# Patient Record
Sex: MALE | Race: WHITE | NOT HISPANIC OR LATINO | Employment: OTHER | URBAN - METROPOLITAN AREA
[De-identification: names, ages, dates, MRNs, and addresses within clinical notes are randomized per-mention and may not be internally consistent; named-entity substitution may affect disease eponyms.]

---

## 2021-07-20 ENCOUNTER — TELEPHONE (OUTPATIENT)
Dept: FAMILY MEDICINE CLINIC | Facility: CLINIC | Age: 61
End: 2021-07-20

## 2021-07-20 NOTE — TELEPHONE ENCOUNTER
Caller: Joseph Garcia    Relationship: Self    Best call back number: [phone]    What is the best time to reach you: ANYTIME     Who are you requesting to speak with (clinical staff, provider,  specific staff member): GARY KEENE        What was the call regarding: PATIENT STATES THAT HE BROUGHT HIS MOTHER TO AN APPOINTMENT TO SEE GARY KEENE. AT THAT APPOINTMENT THE PATIENT WAS VERY IMPRESSED HE STATES THAT HE WOULD LIKE TO BE SEEN BY DOCTOR KEENE  FOR PAIN IN HIS LEFT EAR. THE PATIENT DID NOT WANT TO DO A NEW PATIENT APPOINTMENT AND BECOME ESTABLISHED YET HE STATES THAT HE WOULD LIKE TO SEE HOW THE DOCTOR TREATS HIS EAR FIRST PLEASE CALL PATIENT TO DISCUSS    Do you require a callback: YES

## 2021-07-20 NOTE — TELEPHONE ENCOUNTER
Pt has never been to [facility] before but does not want a new patient appt just wants to be seen by you as an acute visit for his ear. Can you advise if this is okay?

## 2021-07-21 NOTE — TELEPHONE ENCOUNTER
Pt was wanting just the ear issued address, pt stated he is going to go to [facility]

## 2021-10-03 ENCOUNTER — TRANSCRIBE ORDERS (OUTPATIENT)
Dept: HOME HEALTH SERVICES | Facility: HOME HEALTHCARE | Age: 61
End: 2021-10-03

## 2021-10-03 ENCOUNTER — HOME HEALTH ADMISSION (OUTPATIENT)
Dept: HOME HEALTH SERVICES | Facility: HOME HEALTHCARE | Age: 61
End: 2021-10-03

## 2021-10-03 DIAGNOSIS — Z47.89 UNSPECIFIED ORTHOPEDIC AFTERCARE: Primary | ICD-10-CM

## 2021-12-18 ENCOUNTER — APPOINTMENT (OUTPATIENT)
Dept: GENERAL RADIOLOGY | Facility: HOSPITAL | Age: 61
End: 2021-12-18

## 2021-12-18 ENCOUNTER — HOSPITAL ENCOUNTER (OUTPATIENT)
Facility: HOSPITAL | Age: 61
Discharge: HOME | End: 2021-12-19
Attending: EMERGENCY MEDICINE | Admitting: UROLOGY

## 2021-12-18 ENCOUNTER — ANESTHESIA (OUTPATIENT)
Dept: PERIOP | Facility: HOSPITAL | Age: 61
End: 2021-12-18

## 2021-12-18 ENCOUNTER — APPOINTMENT (OUTPATIENT)
Dept: CT IMAGING | Facility: HOSPITAL | Age: 61
End: 2021-12-18

## 2021-12-18 ENCOUNTER — ANESTHESIA EVENT (OUTPATIENT)
Dept: PERIOP | Facility: HOSPITAL | Age: 61
End: 2021-12-18

## 2021-12-18 DIAGNOSIS — N20.1 LEFT URETERAL STONE: Primary | ICD-10-CM

## 2021-12-18 DIAGNOSIS — N20.1 URETERIC STONE: ICD-10-CM

## 2021-12-18 DIAGNOSIS — N28.9 MILD RENAL INSUFFICIENCY: ICD-10-CM

## 2021-12-18 DIAGNOSIS — R52 UNCONTROLLED PAIN: ICD-10-CM

## 2021-12-18 PROBLEM — N17.9 AKI (ACUTE KIDNEY INJURY): Status: ACTIVE | Noted: 2021-12-18

## 2021-12-18 LAB
ALBUMIN SERPL-MCNC: 4.6 G/DL (ref 3.5–5.2)
ALBUMIN/GLOB SERPL: 1.6 G/DL
ALP SERPL-CCNC: 94 U/L (ref 39–117)
ALT SERPL W P-5'-P-CCNC: 12 U/L (ref 1–41)
ANION GAP SERPL CALCULATED.3IONS-SCNC: 16 MMOL/L (ref 5–15)
AST SERPL-CCNC: 22 U/L (ref 1–40)
BACTERIA UR QL AUTO: ABNORMAL /HPF
BASOPHILS # BLD AUTO: 0.05 10*3/MM3 (ref 0–0.2)
BASOPHILS NFR BLD AUTO: 0.4 % (ref 0–1.5)
BILIRUB SERPL-MCNC: 0.4 MG/DL (ref 0–1.2)
BILIRUB UR QL STRIP: NEGATIVE
BUN SERPL-MCNC: 23 MG/DL (ref 8–23)
BUN/CREAT SERPL: 14.4 (ref 7–25)
CALCIUM SPEC-SCNC: 10.4 MG/DL (ref 8.6–10.5)
CHLORIDE SERPL-SCNC: 103 MMOL/L (ref 98–107)
CLARITY UR: CLEAR
CO2 SERPL-SCNC: 22 MMOL/L (ref 22–29)
COLOR UR: YELLOW
CREAT SERPL-MCNC: 1.6 MG/DL (ref 0.76–1.27)
DEPRECATED RDW RBC AUTO: 40.2 FL (ref 37–54)
EOSINOPHIL # BLD AUTO: 0.07 10*3/MM3 (ref 0–0.4)
EOSINOPHIL NFR BLD AUTO: 0.5 % (ref 0.3–6.2)
ERYTHROCYTE [DISTWIDTH] IN BLOOD BY AUTOMATED COUNT: 12.9 % (ref 12.3–15.4)
FLUAV SUBTYP SPEC NAA+PROBE: NOT DETECTED
FLUBV RNA ISLT QL NAA+PROBE: NOT DETECTED
GFR SERPL CREATININE-BSD FRML MDRD: 44 ML/MIN/1.73
GLOBULIN UR ELPH-MCNC: 2.9 GM/DL
GLUCOSE SERPL-MCNC: 158 MG/DL (ref 65–99)
GLUCOSE UR STRIP-MCNC: NEGATIVE MG/DL
HBA1C MFR BLD: 6.2 % (ref 4.8–5.6)
HCT VFR BLD AUTO: 41.2 % (ref 37.5–51)
HGB BLD-MCNC: 13.8 G/DL (ref 13–17.7)
HGB UR QL STRIP.AUTO: ABNORMAL
HYALINE CASTS UR QL AUTO: ABNORMAL /LPF
IMM GRANULOCYTES # BLD AUTO: 0.05 10*3/MM3 (ref 0–0.05)
IMM GRANULOCYTES NFR BLD AUTO: 0.4 % (ref 0–0.5)
KETONES UR QL STRIP: ABNORMAL
LEUKOCYTE ESTERASE UR QL STRIP.AUTO: NEGATIVE
LYMPHOCYTES # BLD AUTO: 1.11 10*3/MM3 (ref 0.7–3.1)
LYMPHOCYTES NFR BLD AUTO: 8.6 % (ref 19.6–45.3)
MCH RBC QN AUTO: 28.6 PG (ref 26.6–33)
MCHC RBC AUTO-ENTMCNC: 33.5 G/DL (ref 31.5–35.7)
MCV RBC AUTO: 85.3 FL (ref 79–97)
MONOCYTES # BLD AUTO: 0.35 10*3/MM3 (ref 0.1–0.9)
MONOCYTES NFR BLD AUTO: 2.7 % (ref 5–12)
NEUTROPHILS NFR BLD AUTO: 11.3 10*3/MM3 (ref 1.7–7)
NEUTROPHILS NFR BLD AUTO: 87.4 % (ref 42.7–76)
NITRITE UR QL STRIP: NEGATIVE
NRBC BLD AUTO-RTO: 0 /100 WBC (ref 0–0.2)
PH UR STRIP.AUTO: 5.5 [PH] (ref 5–8)
PLATELET # BLD AUTO: 201 10*3/MM3 (ref 140–450)
PMV BLD AUTO: 12.2 FL (ref 6–12)
POTASSIUM SERPL-SCNC: 4.2 MMOL/L (ref 3.5–5.2)
PROCALCITONIN SERPL-MCNC: 0.03 NG/ML (ref 0–0.25)
PROT SERPL-MCNC: 7.5 G/DL (ref 6–8.5)
PROT UR QL STRIP: NEGATIVE
QT INTERVAL: 392 MS
QT INTERVAL: 416 MS
QTC INTERVAL: 439 MS
QTC INTERVAL: 443 MS
RBC # BLD AUTO: 4.83 10*6/MM3 (ref 4.14–5.8)
RBC # UR STRIP: ABNORMAL /HPF
REF LAB TEST METHOD: ABNORMAL
SARS-COV-2 RNA PNL SPEC NAA+PROBE: NOT DETECTED
SODIUM SERPL-SCNC: 141 MMOL/L (ref 136–145)
SP GR UR STRIP: 1.02 (ref 1–1.03)
SQUAMOUS #/AREA URNS HPF: ABNORMAL /HPF
UROBILINOGEN UR QL STRIP: ABNORMAL
WBC # UR STRIP: ABNORMAL /HPF
WBC NRBC COR # BLD: 12.93 10*3/MM3 (ref 3.4–10.8)

## 2021-12-18 PROCEDURE — 25010000002 HYDROMORPHONE PER 4 MG: Performed by: INTERNAL MEDICINE

## 2021-12-18 PROCEDURE — 96374 THER/PROPH/DIAG INJ IV PUSH: CPT

## 2021-12-18 PROCEDURE — 25010000002 PROPOFOL 10 MG/ML EMULSION: Performed by: NURSE ANESTHETIST, CERTIFIED REGISTERED

## 2021-12-18 PROCEDURE — 99204 OFFICE O/P NEW MOD 45 MIN: CPT | Performed by: UROLOGY

## 2021-12-18 PROCEDURE — G0378 HOSPITAL OBSERVATION PER HR: HCPCS

## 2021-12-18 PROCEDURE — 25010000002 ONDANSETRON PER 1 MG: Performed by: INTERNAL MEDICINE

## 2021-12-18 PROCEDURE — 25010000002 IOPAMIDOL 61 % SOLUTION: Performed by: UROLOGY

## 2021-12-18 PROCEDURE — 25010000002 DEXAMETHASONE PER 1 MG: Performed by: NURSE ANESTHETIST, CERTIFIED REGISTERED

## 2021-12-18 PROCEDURE — 80053 COMPREHEN METABOLIC PANEL: CPT | Performed by: EMERGENCY MEDICINE

## 2021-12-18 PROCEDURE — 96376 TX/PRO/DX INJ SAME DRUG ADON: CPT

## 2021-12-18 PROCEDURE — 93010 ELECTROCARDIOGRAM REPORT: CPT | Performed by: INTERNAL MEDICINE

## 2021-12-18 PROCEDURE — 25010000002 ONDANSETRON PER 1 MG: Performed by: EMERGENCY MEDICINE

## 2021-12-18 PROCEDURE — 25010000002 FENTANYL CITRATE (PF) 50 MCG/ML SOLUTION: Performed by: NURSE ANESTHETIST, CERTIFIED REGISTERED

## 2021-12-18 PROCEDURE — 74176 CT ABD & PELVIS W/O CONTRAST: CPT

## 2021-12-18 PROCEDURE — 93005 ELECTROCARDIOGRAM TRACING: CPT | Performed by: EMERGENCY MEDICINE

## 2021-12-18 PROCEDURE — 52356 CYSTO/URETERO W/LITHOTRIPSY: CPT | Performed by: UROLOGY

## 2021-12-18 PROCEDURE — 99284 EMERGENCY DEPT VISIT MOD MDM: CPT

## 2021-12-18 PROCEDURE — 25010000002 PROCHLORPERAZINE 10 MG/2ML SOLUTION: Performed by: UROLOGY

## 2021-12-18 PROCEDURE — 96375 TX/PRO/DX INJ NEW DRUG ADDON: CPT

## 2021-12-18 PROCEDURE — 84145 PROCALCITONIN (PCT): CPT | Performed by: INTERNAL MEDICINE

## 2021-12-18 PROCEDURE — 87636 SARSCOV2 & INF A&B AMP PRB: CPT | Performed by: INTERNAL MEDICINE

## 2021-12-18 PROCEDURE — 25010000002 HYDROMORPHONE PER 4 MG: Performed by: EMERGENCY MEDICINE

## 2021-12-18 PROCEDURE — 0 CEFAZOLIN IN DEXTROSE 2-4 GM/100ML-% SOLUTION: Performed by: NURSE ANESTHETIST, CERTIFIED REGISTERED

## 2021-12-18 PROCEDURE — 99220 PR INITIAL OBSERVATION CARE/DAY 70 MINUTES: CPT | Performed by: INTERNAL MEDICINE

## 2021-12-18 PROCEDURE — 85025 COMPLETE CBC W/AUTO DIFF WBC: CPT | Performed by: EMERGENCY MEDICINE

## 2021-12-18 PROCEDURE — C1769 GUIDE WIRE: HCPCS | Performed by: UROLOGY

## 2021-12-18 PROCEDURE — 25010000002 ONDANSETRON PER 1 MG: Performed by: UROLOGY

## 2021-12-18 PROCEDURE — 96361 HYDRATE IV INFUSION ADD-ON: CPT

## 2021-12-18 PROCEDURE — 81001 URINALYSIS AUTO W/SCOPE: CPT | Performed by: EMERGENCY MEDICINE

## 2021-12-18 PROCEDURE — 76000 FLUOROSCOPY <1 HR PHYS/QHP: CPT

## 2021-12-18 PROCEDURE — C2617 STENT, NON-COR, TEM W/O DEL: HCPCS | Performed by: UROLOGY

## 2021-12-18 PROCEDURE — 25010000002 SUCCINYLCHOLINE PER 20 MG: Performed by: NURSE ANESTHETIST, CERTIFIED REGISTERED

## 2021-12-18 PROCEDURE — 82365 CALCULUS SPECTROSCOPY: CPT | Performed by: UROLOGY

## 2021-12-18 PROCEDURE — C9803 HOPD COVID-19 SPEC COLLECT: HCPCS

## 2021-12-18 PROCEDURE — 93005 ELECTROCARDIOGRAM TRACING: CPT | Performed by: INTERNAL MEDICINE

## 2021-12-18 PROCEDURE — 83036 HEMOGLOBIN GLYCOSYLATED A1C: CPT | Performed by: INTERNAL MEDICINE

## 2021-12-18 DEVICE — URETERAL STENT
Type: IMPLANTABLE DEVICE | Site: URETER | Status: FUNCTIONAL
Brand: PERCUFLEX™ PLUS

## 2021-12-18 RX ORDER — IPRATROPIUM BROMIDE AND ALBUTEROL SULFATE 2.5; .5 MG/3ML; MG/3ML
3 SOLUTION RESPIRATORY (INHALATION) ONCE AS NEEDED
Status: DISCONTINUED | OUTPATIENT
Start: 2021-12-18 | End: 2021-12-18 | Stop reason: HOSPADM

## 2021-12-18 RX ORDER — ONDANSETRON 2 MG/ML
4 INJECTION INTRAMUSCULAR; INTRAVENOUS EVERY 6 HOURS PRN
Status: DISCONTINUED | OUTPATIENT
Start: 2021-12-18 | End: 2021-12-19 | Stop reason: SDUPTHER

## 2021-12-18 RX ORDER — ONDANSETRON 2 MG/ML
4 INJECTION INTRAMUSCULAR; INTRAVENOUS ONCE AS NEEDED
Status: DISCONTINUED | OUTPATIENT
Start: 2021-12-18 | End: 2021-12-18 | Stop reason: HOSPADM

## 2021-12-18 RX ORDER — CALCIUM CARBONATE 200(500)MG
2 TABLET,CHEWABLE ORAL 3 TIMES DAILY PRN
Status: DISCONTINUED | OUTPATIENT
Start: 2021-12-18 | End: 2021-12-19 | Stop reason: HOSPADM

## 2021-12-18 RX ORDER — MAGNESIUM HYDROXIDE 1200 MG/15ML
LIQUID ORAL AS NEEDED
Status: DISCONTINUED | OUTPATIENT
Start: 2021-12-18 | End: 2021-12-18 | Stop reason: HOSPADM

## 2021-12-18 RX ORDER — SODIUM CHLORIDE 0.9 % (FLUSH) 0.9 %
10 SYRINGE (ML) INJECTION EVERY 12 HOURS SCHEDULED
Status: DISCONTINUED | OUTPATIENT
Start: 2021-12-18 | End: 2021-12-19 | Stop reason: HOSPADM

## 2021-12-18 RX ORDER — SODIUM CHLORIDE 0.9 % (FLUSH) 0.9 %
10 SYRINGE (ML) INJECTION AS NEEDED
Status: DISCONTINUED | OUTPATIENT
Start: 2021-12-18 | End: 2021-12-19 | Stop reason: HOSPADM

## 2021-12-18 RX ORDER — OXYCODONE AND ACETAMINOPHEN 7.5; 325 MG/1; MG/1
1 TABLET ORAL ONCE
Status: COMPLETED | OUTPATIENT
Start: 2021-12-18 | End: 2021-12-18

## 2021-12-18 RX ORDER — ONDANSETRON 2 MG/ML
4 INJECTION INTRAMUSCULAR; INTRAVENOUS EVERY 6 HOURS PRN
Status: DISCONTINUED | OUTPATIENT
Start: 2021-12-18 | End: 2021-12-19 | Stop reason: HOSPADM

## 2021-12-18 RX ORDER — PROCHLORPERAZINE EDISYLATE 5 MG/ML
2.5 INJECTION INTRAMUSCULAR; INTRAVENOUS EVERY 6 HOURS PRN
Status: DISCONTINUED | OUTPATIENT
Start: 2021-12-18 | End: 2021-12-19 | Stop reason: HOSPADM

## 2021-12-18 RX ORDER — ONDANSETRON 2 MG/ML
4 INJECTION INTRAMUSCULAR; INTRAVENOUS
Status: COMPLETED | OUTPATIENT
Start: 2021-12-18 | End: 2021-12-18

## 2021-12-18 RX ORDER — HYDROMORPHONE HYDROCHLORIDE 1 MG/ML
1 INJECTION, SOLUTION INTRAMUSCULAR; INTRAVENOUS; SUBCUTANEOUS
Status: DISCONTINUED | OUTPATIENT
Start: 2021-12-18 | End: 2021-12-19 | Stop reason: HOSPADM

## 2021-12-18 RX ORDER — TAMSULOSIN HYDROCHLORIDE 0.4 MG/1
0.4 CAPSULE ORAL ONCE
Status: COMPLETED | OUTPATIENT
Start: 2021-12-18 | End: 2021-12-18

## 2021-12-18 RX ORDER — FENTANYL CITRATE 50 UG/ML
INJECTION, SOLUTION INTRAMUSCULAR; INTRAVENOUS AS NEEDED
Status: DISCONTINUED | OUTPATIENT
Start: 2021-12-18 | End: 2021-12-18 | Stop reason: SURG

## 2021-12-18 RX ORDER — HYDROMORPHONE HYDROCHLORIDE 1 MG/ML
1 INJECTION, SOLUTION INTRAMUSCULAR; INTRAVENOUS; SUBCUTANEOUS ONCE
Status: COMPLETED | OUTPATIENT
Start: 2021-12-18 | End: 2021-12-18

## 2021-12-18 RX ORDER — SODIUM CHLORIDE 9 MG/ML
100 INJECTION, SOLUTION INTRAVENOUS CONTINUOUS
Status: ACTIVE | OUTPATIENT
Start: 2021-12-18 | End: 2021-12-18

## 2021-12-18 RX ORDER — CEFAZOLIN SODIUM 2 G/100ML
INJECTION, SOLUTION INTRAVENOUS AS NEEDED
Status: DISCONTINUED | OUTPATIENT
Start: 2021-12-18 | End: 2021-12-18 | Stop reason: SURG

## 2021-12-18 RX ORDER — POLYETHYLENE GLYCOL 3350 17 G/17G
17 POWDER, FOR SOLUTION ORAL DAILY PRN
Status: DISCONTINUED | OUTPATIENT
Start: 2021-12-18 | End: 2021-12-19 | Stop reason: HOSPADM

## 2021-12-18 RX ORDER — TAMSULOSIN HYDROCHLORIDE 0.4 MG/1
0.4 CAPSULE ORAL DAILY
Status: DISCONTINUED | OUTPATIENT
Start: 2021-12-18 | End: 2021-12-19 | Stop reason: HOSPADM

## 2021-12-18 RX ORDER — HYDROMORPHONE HCL 110MG/55ML
1 PATIENT CONTROLLED ANALGESIA SYRINGE INTRAVENOUS
Status: DISCONTINUED | OUTPATIENT
Start: 2021-12-18 | End: 2021-12-19 | Stop reason: HOSPADM

## 2021-12-18 RX ORDER — CHOLECALCIFEROL (VITAMIN D3) 125 MCG
5 CAPSULE ORAL NIGHTLY PRN
Status: DISCONTINUED | OUTPATIENT
Start: 2021-12-18 | End: 2021-12-19 | Stop reason: HOSPADM

## 2021-12-18 RX ORDER — BISACODYL 5 MG/1
5 TABLET, DELAYED RELEASE ORAL DAILY PRN
Status: DISCONTINUED | OUTPATIENT
Start: 2021-12-18 | End: 2021-12-19 | Stop reason: HOSPADM

## 2021-12-18 RX ORDER — FAMOTIDINE 10 MG/ML
INJECTION, SOLUTION INTRAVENOUS
Status: COMPLETED
Start: 2021-12-18 | End: 2021-12-18

## 2021-12-18 RX ORDER — ROCURONIUM BROMIDE 10 MG/ML
INJECTION, SOLUTION INTRAVENOUS AS NEEDED
Status: DISCONTINUED | OUTPATIENT
Start: 2021-12-18 | End: 2021-12-18 | Stop reason: SURG

## 2021-12-18 RX ORDER — OXYCODONE AND ACETAMINOPHEN 10; 325 MG/1; MG/1
1 TABLET ORAL EVERY 4 HOURS PRN
Status: DISCONTINUED | OUTPATIENT
Start: 2021-12-18 | End: 2021-12-19 | Stop reason: HOSPADM

## 2021-12-18 RX ORDER — BISACODYL 10 MG
10 SUPPOSITORY, RECTAL RECTAL DAILY PRN
Status: DISCONTINUED | OUTPATIENT
Start: 2021-12-18 | End: 2021-12-19 | Stop reason: HOSPADM

## 2021-12-18 RX ORDER — TRAMADOL HYDROCHLORIDE 50 MG/1
50 TABLET ORAL EVERY 8 HOURS SCHEDULED
Status: DISCONTINUED | OUTPATIENT
Start: 2021-12-18 | End: 2021-12-19 | Stop reason: HOSPADM

## 2021-12-18 RX ORDER — SUCCINYLCHOLINE CHLORIDE 20 MG/ML
INJECTION INTRAMUSCULAR; INTRAVENOUS AS NEEDED
Status: DISCONTINUED | OUTPATIENT
Start: 2021-12-18 | End: 2021-12-18 | Stop reason: SURG

## 2021-12-18 RX ORDER — FAMOTIDINE 10 MG/ML
20 INJECTION, SOLUTION INTRAVENOUS EVERY 12 HOURS SCHEDULED
Status: DISCONTINUED | OUTPATIENT
Start: 2021-12-18 | End: 2021-12-18 | Stop reason: HOSPADM

## 2021-12-18 RX ORDER — DEXAMETHASONE SODIUM PHOSPHATE 4 MG/ML
INJECTION, SOLUTION INTRA-ARTICULAR; INTRALESIONAL; INTRAMUSCULAR; INTRAVENOUS; SOFT TISSUE AS NEEDED
Status: DISCONTINUED | OUTPATIENT
Start: 2021-12-18 | End: 2021-12-18 | Stop reason: SURG

## 2021-12-18 RX ORDER — AMOXICILLIN 250 MG
2 CAPSULE ORAL 2 TIMES DAILY
Status: DISCONTINUED | OUTPATIENT
Start: 2021-12-18 | End: 2021-12-19 | Stop reason: HOSPADM

## 2021-12-18 RX ORDER — ACETAMINOPHEN 325 MG/1
650 TABLET ORAL EVERY 4 HOURS PRN
Status: DISCONTINUED | OUTPATIENT
Start: 2021-12-18 | End: 2021-12-19 | Stop reason: HOSPADM

## 2021-12-18 RX ORDER — ATORVASTATIN CALCIUM 40 MG/1
40 TABLET, FILM COATED ORAL NIGHTLY
Status: DISCONTINUED | OUTPATIENT
Start: 2021-12-18 | End: 2021-12-19 | Stop reason: HOSPADM

## 2021-12-18 RX ORDER — LIDOCAINE HYDROCHLORIDE 10 MG/ML
INJECTION, SOLUTION EPIDURAL; INFILTRATION; INTRACAUDAL; PERINEURAL AS NEEDED
Status: DISCONTINUED | OUTPATIENT
Start: 2021-12-18 | End: 2021-12-18 | Stop reason: SURG

## 2021-12-18 RX ORDER — PROPOFOL 10 MG/ML
VIAL (ML) INTRAVENOUS AS NEEDED
Status: DISCONTINUED | OUTPATIENT
Start: 2021-12-18 | End: 2021-12-18 | Stop reason: SURG

## 2021-12-18 RX ADMIN — HYDROMORPHONE HYDROCHLORIDE 1 MG: 2 INJECTION, SOLUTION INTRAMUSCULAR; INTRAVENOUS; SUBCUTANEOUS at 05:07

## 2021-12-18 RX ADMIN — DEXAMETHASONE SODIUM PHOSPHATE 8 MG: 4 INJECTION, SOLUTION INTRA-ARTICULAR; INTRALESIONAL; INTRAMUSCULAR; INTRAVENOUS; SOFT TISSUE at 15:23

## 2021-12-18 RX ADMIN — TAMSULOSIN HYDROCHLORIDE 0.4 MG: 0.4 CAPSULE ORAL at 13:39

## 2021-12-18 RX ADMIN — PROCHLORPERAZINE EDISYLATE 2.5 MG: 5 INJECTION INTRAMUSCULAR; INTRAVENOUS at 20:09

## 2021-12-18 RX ADMIN — HYDROMORPHONE HYDROCHLORIDE 1 MG: 2 INJECTION, SOLUTION INTRAMUSCULAR; INTRAVENOUS; SUBCUTANEOUS at 12:34

## 2021-12-18 RX ADMIN — SODIUM CHLORIDE 100 ML/HR: 9 INJECTION, SOLUTION INTRAVENOUS at 08:26

## 2021-12-18 RX ADMIN — HYDROMORPHONE HYDROCHLORIDE 1 MG: 1 INJECTION, SOLUTION INTRAMUSCULAR; INTRAVENOUS; SUBCUTANEOUS at 07:53

## 2021-12-18 RX ADMIN — ONDANSETRON 4 MG: 2 INJECTION INTRAMUSCULAR; INTRAVENOUS at 12:32

## 2021-12-18 RX ADMIN — ROCURONIUM BROMIDE 5 MG: 10 INJECTION INTRAVENOUS at 15:08

## 2021-12-18 RX ADMIN — PROPOFOL 200 MG: 10 INJECTION, EMULSION INTRAVENOUS at 15:08

## 2021-12-18 RX ADMIN — DOCUSATE SODIUM 50 MG AND SENNOSIDES 8.6 MG 2 TABLET: 8.6; 5 TABLET, FILM COATED ORAL at 20:10

## 2021-12-18 RX ADMIN — ATORVASTATIN CALCIUM 40 MG: 40 TABLET, FILM COATED ORAL at 20:10

## 2021-12-18 RX ADMIN — ONDANSETRON 4 MG: 2 INJECTION INTRAMUSCULAR; INTRAVENOUS at 15:33

## 2021-12-18 RX ADMIN — HYDROMORPHONE HYDROCHLORIDE 1 MG: 2 INJECTION, SOLUTION INTRAMUSCULAR; INTRAVENOUS; SUBCUTANEOUS at 04:37

## 2021-12-18 RX ADMIN — OXYCODONE HYDROCHLORIDE AND ACETAMINOPHEN 1 TABLET: 10; 325 TABLET ORAL at 13:40

## 2021-12-18 RX ADMIN — OXYCODONE HYDROCHLORIDE AND ACETAMINOPHEN 1 TABLET: 10; 325 TABLET ORAL at 20:19

## 2021-12-18 RX ADMIN — CEFAZOLIN SODIUM 2 G: 2 INJECTION, SOLUTION INTRAVENOUS at 15:14

## 2021-12-18 RX ADMIN — OXYCODONE HYDROCHLORIDE AND ACETAMINOPHEN 1 TABLET: 7.5; 325 TABLET ORAL at 07:33

## 2021-12-18 RX ADMIN — HYDROMORPHONE HYDROCHLORIDE 1 MG: 2 INJECTION, SOLUTION INTRAMUSCULAR; INTRAVENOUS; SUBCUTANEOUS at 05:57

## 2021-12-18 RX ADMIN — HYDROMORPHONE HYDROCHLORIDE 1 MG: 2 INJECTION, SOLUTION INTRAMUSCULAR; INTRAVENOUS; SUBCUTANEOUS at 09:15

## 2021-12-18 RX ADMIN — FAMOTIDINE 20 MG: 10 INJECTION INTRAVENOUS at 14:53

## 2021-12-18 RX ADMIN — ONDANSETRON 4 MG: 2 INJECTION INTRAMUSCULAR; INTRAVENOUS at 05:12

## 2021-12-18 RX ADMIN — TAMSULOSIN HYDROCHLORIDE 0.4 MG: 0.4 CAPSULE ORAL at 07:33

## 2021-12-18 RX ADMIN — ONDANSETRON 4 MG: 2 INJECTION INTRAMUSCULAR; INTRAVENOUS at 07:52

## 2021-12-18 RX ADMIN — FENTANYL CITRATE 100 MCG: 50 INJECTION, SOLUTION INTRAMUSCULAR; INTRAVENOUS at 15:08

## 2021-12-18 RX ADMIN — HYDROMORPHONE HYDROCHLORIDE 1 MG: 1 INJECTION, SOLUTION INTRAMUSCULAR; INTRAVENOUS; SUBCUTANEOUS at 13:16

## 2021-12-18 RX ADMIN — LIDOCAINE HYDROCHLORIDE 50 MG: 10 INJECTION, SOLUTION EPIDURAL; INFILTRATION; INTRACAUDAL; PERINEURAL at 15:08

## 2021-12-18 RX ADMIN — ONDANSETRON 4 MG: 2 INJECTION INTRAMUSCULAR; INTRAVENOUS at 09:13

## 2021-12-18 RX ADMIN — Medication 200 MG: at 15:08

## 2021-12-18 RX ADMIN — DOCUSATE SODIUM 50 MG AND SENNOSIDES 8.6 MG 2 TABLET: 8.6; 5 TABLET, FILM COATED ORAL at 13:39

## 2021-12-18 RX ADMIN — SODIUM CHLORIDE, PRESERVATIVE FREE 10 ML: 5 INJECTION INTRAVENOUS at 20:11

## 2021-12-18 NOTE — OP NOTE
URETEROSCOPY LASER LITHOTRIPSY WITH STENT INSERTION, CYSTOSCOPY RETROGRADE PYELOGRAM  Procedure Report    Patient Name:  Joseph Garcia  YOB: 1960    Date of Surgery:  12/18/2021     Indications: 61-year-old male presenting to [facility] emergency room with acute left flank pain.  CT imaging demonstrating approximately 4 to 5 mm distal left ureteral stone with hydroureteronephrosis.  Patient report significant flank pain approximately 1 month ago with resolution.  New onset flank pain associate with nausea and emesis resulted in presentation.  Continued pain despite IV pain control.  Continued nausea and emesis.  Discussed indications for operative intervention including risks and benefits and patient elected to proceed to the operating room for ureteroscopy and laser lithotripsy.    Pre-op Diagnosis:   Left ureteral stone.       Post-Op Diagnosis Codes:  Left ureteral stone.    Procedure/CPT® Codes: 76382, 94567      Procedure(s):  1. CYSTOSCOPY, URETEROSCOPY, LASER LITHOTRIPSY, BASKET EXTRACTION,  URETERAL CATHETER/STENT INSERTION  2. CYSTOSCOPY RETROGRADE PYELOGRAM    Staff:  Surgeon(s):  Rick Hargrove MD         Anesthesia: General    Estimated Blood Loss: none    Implants:    Implant Name Type Inv. Item Serial No.  Lot No. LRB No. Used Action   STNT PERCUFLX NO GW 4.8X26 - [id number] Stent STNT PERCUFLX NO GW 4.8X26  [employer] [id number] Left 1 Implanted       Specimen:          Specimens     ID Source Type Tests Collected By Collected At Frozen?    1 Kidney, Left Calculus · STONE ANALYSIS   Rick Hargrove MD 12/18/21 1009               Findings:   1.  Normal urinary bladder without evidence of tumor stone or foreign body  2.  4 to 5 mm left ureteral stone.  Laser lithotripsy with a 200 µm laser fiber.  Stone broken into 2 fragments and each fragment removed via basket extraction.  3.  Uteroscopy identifying and visualizing distal mid and proximal ureter to ensure  stone clearance.  4.  Left retrograde pyelography with imaging used for placement of stent.  On room pyelography there was concern for possible filling defect within the left renal collecting system.  5.  Left pyeloscopy to evaluate collecting system to ensure no filling defect.  Pyeloscopy performed with air bubble demonstrated.  No concerning lesions within the collecting system of the kidney.  The upper mid and lower poles of the kidney were evaluated.  6.  Successful placement of 4.8 x 26 cm ureteral stent on a string    Complications: None immediate    Description of Procedure:     After informed consent, the patient was brought back to the operating suite and moved over to the operating table. General anesthesia was smoothly induced, IV antibiotics were administered, and the patient was placed in the dorsal lithotomy position with careful attention focused on padding all pressure points. The patient was prepped and draped in standard fashion. A timeout was performed to ensure the correct patient and procedure    A 22Fr cystoscope was used to cannulate the urethra. The urethra was of normal course and caliber.  Upon entering the bladder, pan-cystoscopy revealed no bladder abnormalities. There were no stones, no diverticuli, and no trabeculations. The bilateral ureteral orifices were visualized in their orthotopic positions. Attention was then turned to the left ureteral orifice which was cannulated with a sensor wire. This was advanced into the left kidney under fluoroscopic guidance. The bladder was drained and the cystoscope was removed. The wire was secured as a safety wire.    SEMIRIGID URETEROSCOPY  The semi-rigid ureteroscope was then inserted back into the bladder. The left ureter was cannulated. The ureteroscope was advanced into the left ureter. There was 4-5mm stone seen.  A 200 µm laser fiber was passed through the scope.  The stone was fragmented into 2 a 1 9 basket was then inserted and each of  these fragments removed.  The semirigid ureteroscope was then passed to the distal mid and proximal ureter to ensure there are stone clearance.  A 5 Bulgarian Pollick was advanced over wire.  Retrograde pyelogram was performed.  This was to be used for correct placement of stent.  Upon the imaging there was concern for possible filling defect within the renal collecting system.  Therefore a second wire was placed    PYELOSCOPY  A flexible ureteroscope was placed over the wire up to the level of the renal pelvis.  Formal pyeloscopy was performed within the renal collecting system.  The upper mid and lower poles of the kidney were evaluated.  The area of filling defect was visualized with confirmation on multiple fluoroscopic imaging studies.  There was no filling defect identified.  Air bubble was identified.  Once this was confirmed no filling defect was present.  Our scope was removed again visualizing the entire to the ureter to ensure no stone fragments noted.      FLUORO STENT PLACEMENT  Using fluoroscopic guidance, a ureteral stent was then placed. A 4.8 F x 26 cm double J ureteral stent was advanced up the left ureter over our safety wire. Fluoroscopy confirmed good curl in both the kidney and the bladder. The bladder was drained, and this concluded our procedure.    The patient was brought back to the PACU in stable condition. All scopes and instruments were in good working order at the end of the case. There were no complications.      Disposition:  - The patient is considered stable.  He will remain admitted to hospitalist service.  Likely anticipate discharge 12/19/2021 based upon resolution of MAGALY.    Follow up: Renal stent left on strings.  The patient remove his ureteral stent on 12/22/2021.  The patient will follow up with urology in 4 weeks with a renal ultrasound.  Office follow-up will be scheduled at time of discharge.  It was discussed with the patient and his family that ureteral stents are not  permanent and must be removed.          Rick Hargrove MD     Date: 12/18/2021  Time: 16:13 EST

## 2021-12-18 NOTE — CONSULTS
[facility]   HISTORY AND PHYSICAL    Patient Name: Joseph Garcia  : 1960  MRN: [id number]  Primary Care Physician:  Brandon Wei MD  Date of admission: 2021    Subjective   Subjective     Chief Complaint: Left flank pain    HPI:    Joseph Garcia is a 61 y.o. male who presented to the [facility] ED 2021 with acute onset left flank pain.  Patient reports significant associated nausea and emesis.  Denies fever, chills.  Denies lower urinary tract symptoms.  Denies hematuria.  Reports similar episode of pain approximately 1 month ago but pain resolved spontaneously and he never presented for evaluation.  Denies prior history of stone disease.  Denies prior urologic evaluation or instrumentation.  WBC 12, creatinine 1.6, unknown baseline.  UA with negative nitrite LE bacteria.     Review of Systems   The following systems were reviewed and negative;  constitution, respiratory, cardiovascular, gastrointestinal, integument, musculoskeletal, neurological and behavioral/psych.   : Flank pain    Personal History     Past Medical History:   Diagnosis Date   • Hyperlipidemia        Past Surgical History:   Procedure Laterality Date   • KNEE SURGERY     • SHOULDER SURGERY         Family History: family history is not on file. Otherwise pertinent FHx was reviewed and not pertinent to current issue.    Social History:  reports that he has quit smoking. He has never used smokeless tobacco. Drug use questions deferred to the physician. He reports that he does not drink alcohol.    Home Medications:  Diclofenac Epolamine, Diclofenac Potassium(Migraine), Diclofenac Sodium, amoxicillin-clavulanate, atorvastatin, cetirizine, diphenhydrAMINE-APAP (sleep), fluticasone, neomycin-polymyxin-hydrocortisone, tadalafil, tiZANidine, and traMADol      Allergies:  Allergies   Allergen Reactions   • Ibuprofen Hives       Objective   Objective     Vitals:   Temp:  [97.6 °F (36.4 °C)-97.9 °F (36.6 °C)] 97.9 °F (36.6  °C)  Heart Rate:  [64-86] 71  Resp:  [20-22] 22  BP: (139-167)/() 167/99  Flow (L/min):  [2] 2  Physical Exam    Constitutional: Awake in bed, alert    Eyes: PERRLA, sclerae anicteric, no conjunctival injection   HENT: Normocephalic, atraumatic, mucous membranes moist   Neck: Supple, trachea midline   Respiratory:Equal chest rise, non-labored respirations    Cardiovascular: Perfused, no edema   Gastrointestinal: Soft, nontender, non-distended,    Musculoskeletal: No bilateral ankle edema, no clubbing or cyanosis to extremities   Psychiatric: Appropriate affect, cooperative   Neurologic: Oriented x 3, Cranial Nerves grossly intact, speech clear   Skin: No rashes     Result Review    Result Review:  I have personally reviewed the results from the time of this admission to 12/18/2021 14:32 EST and agree with these findings:  [x]  Laboratory  [x]  Microbiology  [x]  Radiology  []  EKG/Telemetry   []  Cardiology/Vascular   []  Pathology  [x]  Old records  []  Other:    Most notable findings include:   WBC 12  Creatinine 1.6  UA negative for bacteria, nitrite, LE    I personally viewed the patient's CT abdomen and pelvis.  Approximately 4 to 5 mm distal left ureteral stone with hydroureteronephrosis.  No additional nephrolithiasis or ureterolithiasis identified.    Assessment/Plan   Assessment / Plan     Brief Patient Summary:  Joseph Garcia is a 61 y.o. male who presented to the [facility] ED 12/18/2021 with acute onset left flank pain.  Patient reports significant associated nausea and emesis.  Denies fever, chills.  Denies lower urinary tract symptoms.  Denies hematuria.  reports similar episode of pain approximately 1 month ago but pain resolved spontaneously and he never presented for evaluation.  Denies prior history of stone disease.  Denies prior urologic evaluation or instrumentation.  We discussed that based upon the patient's continued pain, uncontrolled pain, nausea, emesis and elevated creatinine at  1.6 would recommend operative intervention.  Discussed the risks and benefits of ureteroscopy and laser lithotripsy.  Discussed risks including infection, bleeding, damage to surrounding structures, risk of anesthesia including heart attack stroke and death.  Discussed the patient will have placement of ureteral stent following which will remain in place approximately 3 to 5 days.  He will be instructed on time to remove.  The patient should be appropriate for discharge in 24 hours pending resolution in renal function.    Active Hospital Problems:  Active Hospital Problems    Diagnosis    • Intractable pain    • Ureteral stone    • MAGALY (acute kidney injury) (HCC)    • Left ureteral stone      Plan:     - To OR for cystoscopy, Left Ureteroscopy, Laser Lithotripsy, Stent    DVT prophylaxis:  Mechanical DVT prophylaxis orders are present.    CODE STATUS:    Level Of Support Discussed With: Patient  Code Status (Patient has no pulse and is not breathing): CPR (Attempt to Resuscitate)  Medical Interventions (Patient has pulse or is breathing): Full Support    Admission Status: Would evaluate for improvement in renal function prior to discharge.  Anticipate discharge with pain control 12/19/2021    Electronically signed by Rick Hargrove MD, 12/18/21, 2:32 PM EST.

## 2021-12-18 NOTE — ANESTHESIA PROCEDURE NOTES
Airway  Urgency: elective    Date/Time: 12/18/2021 3:10 PM  Airway not difficult    General Information and Staff    Patient location during procedure: OR  Anesthesiologist: Catarino Joe MD    Indications and Patient Condition  Indications for airway management: airway protection    Preoxygenated: yes  MILS not maintained throughout  Mask difficulty assessment: 1 - vent by mask    Final Airway Details  Final airway type: endotracheal airway      Successful airway: ETT  Cuffed: yes   Successful intubation technique: direct laryngoscopy  Endotracheal tube insertion site: oral  Blade: Henry  Blade size: 4  ETT size (mm): 8.0  Cormack-Lehane Classification: grade I - full view of glottis  Placement verified by: chest auscultation and capnometry   Measured from: lips  ETT/EBT  to lips (cm): 20  Number of attempts at approach: 1  Assessment: lips, teeth, and gum same as pre-op and atraumatic intubation    Additional Comments  Negative epigastric sounds, Breath sound equal bilaterally with symmetric chest rise and fall

## 2021-12-18 NOTE — PLAN OF CARE
Goal Outcome Evaluation:  Plan of Care Reviewed With: patient        Progress: improving  Outcome Summary: PT with uncontrolled pain on arrival from ED- dilaudid and percocet given, PT sent for cysto and pain has resolved. C/O some burning r/t stent placement. VSS, on 4LNC while resting since PACU. Hopes to go home tomorrow.

## 2021-12-18 NOTE — H&P
[facility] Medicine Services  HISTORY AND PHYSICAL    Patient Name: Joseph Garcia  : 1960  MRN: [id number]  Primary Care Physician: Brandon Wei MD  Date of admission: 2021      Subjective   Subjective     Chief Complaint:  Flank pain    HPI:  Joseph Garcia is a 61 y.o. male history of dyslipidemia, obesity who presented to the ER with acute onset abdominal pain that started a few days ago woke up acutely worse yesterday evening.  CT abdomen pelvis concerning for left distal ureteral stone with mild hydroureter.  Patient states he has had 1 previous stone in the past but thinks he may have passed it on his own.  Admits to nausea, but no vomiting.  States had bowel movement yesterday.  No fevers, no chills    COVID Details:    Symptoms:    [x] NONE [] Fever []  Cough [] Shortness of breath [] Change in taste/smell      Review of Systems   Constitutional: Negative for fatigue and fever.   Respiratory: Negative for shortness of breath.    Cardiovascular: Negative for chest pain and leg swelling.   Gastrointestinal: Negative for abdominal pain, constipation, diarrhea, nausea and vomiting.   Genitourinary: Positive for flank pain. Negative for difficulty urinating, dysuria, hematuria and testicular pain.   Musculoskeletal: Positive for back pain.   Skin: Negative for rash and wound.   Neurological: Negative for weakness.        All other systems reviewed and are negative.     Personal History     Past Medical History:   Diagnosis Date   • Hyperlipidemia        Past Surgical History:   Procedure Laterality Date   • KNEE SURGERY     • SHOULDER SURGERY         Family History:  Pertinent FHx was reviewed, negative    Social History:  reports that he has quit smoking. He has never used smokeless tobacco. Drug use questions deferred to the physician. He reports that he does not drink alcohol.  Social History     Social History Narrative   • Not on file       Medications:  Available home  medication information reviewed.  (Not in a hospital admission)      Allergies   Allergen Reactions   • Ibuprofen Hives       Objective   Objective     Vital Signs:   Temp:  [97.6 °F (36.4 °C)] 97.6 °F (36.4 °C)  Heart Rate:  [64-73] 68  Resp:  [20] 20  BP: (139-158)/(80-98) 144/80       Physical Exam  Constitutional:       General: He is not in acute distress.     Appearance: Normal appearance.   HENT:      Head: Normocephalic and atraumatic.      Mouth/Throat:      Mouth: Mucous membranes are dry.   Eyes:      Pupils: Pupils are equal, round, and reactive to light.   Cardiovascular:      Rate and Rhythm: Normal rate and regular rhythm.      Heart sounds: No murmur heard.      Pulmonary:      Effort: Pulmonary effort is normal.      Breath sounds: Normal breath sounds.   Abdominal:      Palpations: Abdomen is soft.      Comments: Obese, left flank, low lumbar area pain   Musculoskeletal:         General: No swelling.   Skin:     General: Skin is warm and dry.   Neurological:      General: No focal deficit present.      Mental Status: He is alert and oriented to person, place, and time.            Result Review:  I have personally reviewed the results from the time of this admission to 12/18/2021 11:09 EST and agree with these findings:  [x]  Laboratory  [x]  Microbiology  []  Radiology  [x]  EKG/Telemetry   []  Cardiology/Vascular   []  Pathology  []  Old records  []  Other:  Most notable findings include:     LAB RESULTS:      Lab 12/18/21  0500   WBC 12.93*   HEMOGLOBIN 13.8   HEMATOCRIT 41.2   PLATELETS 201   NEUTROS ABS 11.30*   IMMATURE GRANS (ABS) 0.05   LYMPHS ABS 1.11   MONOS ABS 0.35   EOS ABS 0.07   MCV 85.3         Lab 12/18/21  0500   SODIUM 141   POTASSIUM 4.2   CHLORIDE 103   CO2 22.0   ANION GAP 16.0*   BUN 23   CREATININE 1.60*   GLUCOSE 158*   CALCIUM 10.4         Lab 12/18/21  0500   TOTAL PROTEIN 7.5   ALBUMIN 4.60   GLOBULIN 2.9   ALT (SGPT) 12   AST (SGOT) 22   BILIRUBIN 0.4   ALK PHOS 94                      UA    Urinalysis 12/18/21 12/18/21    0637 0637   Squamous Epithelial Cells, UA  0-2   Specific Gravity, UA 1.020    Ketones, UA Trace (A)    Blood, UA Small (1+) (A)    Leukocytes, UA Negative    Nitrite, UA Negative    RBC, UA  3-6 (A)   WBC, UA  0-2   Bacteria, UA  None Seen   (A) Abnormal value              Microbiology Results (last 10 days)     Procedure Component Value - Date/Time    COVID PRE-OP / PRE-PROCEDURE SCREENING ORDER (NO ISOLATION) - Swab, Nasopharynx [[id number]]  (Normal) Collected: 12/18/21 0827    Lab Status: Final result Specimen: Swab from Nasopharynx Updated: 12/18/21 0919    Narrative:      The following orders were created for panel order COVID PRE-OP / PRE-PROCEDURE SCREENING ORDER (NO ISOLATION) - Swab, Nasopharynx.  Procedure                               Abnormality         Status                     ---------                               -----------         ------                     COVID-19 and FLU A/B PCR...[[id number]]  Normal              Final result                 Please view results for these tests on the individual orders.    COVID-19 and FLU A/B PCR - Swab, Nasopharynx [[id number]]  (Normal) Collected: 12/18/21 0827    Lab Status: Final result Specimen: Swab from Nasopharynx Updated: 12/18/21 0919     COVID19 Not Detected     Influenza A PCR Not Detected     Influenza B PCR Not Detected    Narrative:      Fact sheet for providers: https://www.fda.gov/media/111058/download    Fact sheet for patients: https://www.fda.gov/media/948795/download    Test performed by PCR.          CT Abdomen Pelvis Without Contrast    Result Date: 12/18/2021  CT Abdomen Pelvis WO INDICATION: Left flank pain this morning with nausea and vomiting TECHNIQUE: CT of the abdomen and pelvis without IV contrast. Coronal and sagittal reconstructions were obtained.  Radiation dose reduction techniques included automated exposure control or exposure modulation based on body size. Count of known  CT and cardiac nuc med studies performed in previous 12 months: 0. COMPARISON: None available. FINDINGS: Abdomen: Lung bases are clear. Liver, gallbladder, spleen, pancreas and adrenal glands are normal. Right kidney is normal. Left kidney shows mild hydronephrosis. Left ureter slightly dilated and there is a distal left ureteral stone measuring 4 mm in diameter. Aorta is normal in size. There is no adenopathy. The bowel is normal. Pelvis: Bladder and prostate gland are normal. Bones are unremarkable.     Impression: 4 mm distal left ureteral stone with mild left hydronephrosis otherwise normal Signer Name: Gianni Bhatia MD  Signed: 12/18/2021 5:38 AM  Workstation Name: [facility]  [facility]          Assessment/Plan   Assessment & Plan     Active Hospital Problems    Diagnosis  POA   • Intractable pain [R52]  Yes   • Ureteral stone [N20.1]  Yes   • MAGALY (acute kidney injury) (HCC) [N17.9]  Yes   • Left ureteral stone [N20.1]  Yes     Flank pain secondary to left ureteral stone  -Urology consult regarding mild hydroureter  -Flomax  -Antiemetics  -Pain control  -Continue IV fluids for 10 hours, has had decreased intake over the last 24 hours  -Clear liquids for now, pending urology eval     Hyperglycemia  -Add A1c to lab  -Hold SSI for now, pending A1c    Suspect MAGALY, versus CKD  -Suspect secondary to decreased intake  -Gentle IVF, recheck in a.m.    DVT prophylaxis: Mechanical      CODE STATUS: Full  Code Status and Medical Interventions:   Ordered at: 12/18/21 0816     Level Of Support Discussed With:    Patient     Code Status (Patient has no pulse and is not breathing):    CPR (Attempt to Resuscitate)     Medical Interventions (Patient has pulse or is breathing):    Full Support       Admission Status:  I believe this patient meets OBSERVATION status, however if further evaluation or treatment plans warrant, status may change.  Based upon current information, I predict patient's care  encounter to be less than or equal to 2 midnights.      Ana Mckinney, DO  12/18/21

## 2021-12-18 NOTE — ANESTHESIA POSTPROCEDURE EVALUATION
Patient: Joseph Garcia    Procedure Summary     Date: 12/18/21 Room / Location:  [facility] OR 07 /  [facility]    Anesthesia Start: 1504 Anesthesia Stop: 1616    Procedures:       CYSTOSCOPY, URETEROSCOPY, LASER LITHOTRIPSY  URETERAL CATHETER/STENT INSERTION (Left Bladder)      CYSTOSCOPY RETROGRADE PYELOGRAM Diagnosis:     Surgeons: Rick Hargrove MD Provider: Catarino Joe MD    Anesthesia Type: general ASA Status: 3          Anesthesia Type: general    Vitals  No vitals data found for the desired time range.          Post Anesthesia Care and Evaluation    Patient location during evaluation: PACU  Patient participation: complete - patient participated  Level of consciousness: awake and alert  Pain management: adequate  Airway patency: patent  Anesthetic complications: No anesthetic complications  PONV Status: none  Cardiovascular status: hemodynamically stable and acceptable  Respiratory status: nonlabored ventilation, acceptable and nasal cannula  Hydration status: acceptable

## 2021-12-18 NOTE — ANESTHESIA PREPROCEDURE EVALUATION
Anesthesia Evaluation     Patient summary reviewed and Nursing notes reviewed   no history of anesthetic complications:  NPO Solid Status: > 8 hours  NPO Liquid Status: > 2 hours           Airway   Mallampati: III  TM distance: >3 FB  Neck ROM: full  Possible difficult intubation  Dental - normal exam     Pulmonary    (+) a smoker Former, decreased breath sounds,   Cardiovascular   Exercise tolerance: good (4-7 METS)    ECG reviewed  Rhythm: regular  Rate: normal    (+) hyperlipidemia,       Neuro/Psych  GI/Hepatic/Renal/Endo    (+) obesity,   renal disease CRI and stones,     Musculoskeletal     Abdominal   (+) obese,     Abdomen: soft.   Substance History      OB/GYN          Other   arthritis,                    Anesthesia Plan    ASA 3     general     intravenous induction     Anesthetic plan, all risks, benefits, and alternatives have been provided, discussed and informed consent has been obtained with: patient.

## 2021-12-18 NOTE — ED PROVIDER NOTES
[place]    EMERGENCY DEPARTMENT ENCOUNTER      Pt Name: Joseph Garcia  MRN: [id number]  YOB: 1960  Date of evaluation: 12/18/2021  Provider: Heber Cruz DO    CHIEF COMPLAINT       Chief Complaint   Patient presents with   • Back Pain         HISTORY OF PRESENT ILLNESS  (Location/Symptom, Timing/Onset, Context/Setting, Quality, Duration, Modifying Factors, Severity.)   Joseph Garcia is a 61 y.o. male who presents to the emergency department for evaluation of acute onset of left-sided flank pain with some mild radiation to left lower quadrant of the abdomen onset 1:00 this morning.  The patient denies any history of similar symptoms in the past.  He does note mild nausea vomiting associate with these episodes secondary to the pain and discomfort.  No history of kidney stones or renal disease.  Denies any fever, chills, he does note generalized sweats associate with these episodes.  The pain is sharp, 10-10 severity, some mild radiation to the left flank.  Denies any urinary complaints, no hematuria.  Denies any falls, injury or recent trauma.  The patient denies any other acute systemic complaints at this time.      Nursing notes were reviewed.    REVIEW OF SYSTEMS    (2-9 systems for level 4, 10 or more for level 5)   ROS:  General:  No fevers, no chills, no weakness  Cardiovascular:  No chest pain, no palpitations  Respiratory:  No shortness of breath, no cough, no wheezing  Gastrointestinal: Positive left flank pain, positive nausea, vomiting, no diarrhea  Musculoskeletal:  No muscle pain, no joint pain  Skin:  No rash  Neurologic:  No headache  Psychiatric:  No anxiety  Genitourinary:  No dysuria, no hematuria    Except as noted above the remainder of the review of systems was reviewed and negative.       PAST MEDICAL HISTORY     Past Medical History:   Diagnosis Date   • Hyperlipidemia          SURGICAL HISTORY       Past Surgical History:   Procedure Laterality Date   • KNEE SURGERY      • SHOULDER SURGERY           CURRENT MEDICATIONS       Current Facility-Administered Medications:   •  HYDROmorphone (DILAUDID) injection 1 mg, 1 mg, Intravenous, Q30 Min PRN, Heber Cruz DO, 1 mg at 12/18/21 0557  •  ondansetron (ZOFRAN) injection 4 mg, 4 mg, Intravenous, Q30 Min PRN, Heber Cruz DO, 4 mg at 12/18/21 0752  •  [COMPLETED] Insert peripheral IV, , , Once **AND** sodium chloride 0.9 % flush 10 mL, 10 mL, Intravenous, PRN, Heber Cruz DO    Current Outpatient Medications:   •  amoxicillin-clavulanate (AUGMENTIN) 875-125 MG per tablet, Take 1 tablet by mouth 2 (Two) Times a Day., Disp: 20 tablet, Rfl: 0  •  atorvastatin (LIPITOR) 40 MG tablet, Take 40 mg by mouth Daily., Disp: , Rfl:   •  CAMBIA 50 MG pack, TAKE 1 PACKET BY MOUTH AT ONSET OF HEADACHE ONCE DAILY, Disp: , Rfl:   •  cetirizine (zyrTEC) 10 MG tablet, Take 10 mg by mouth every night at bedtime., Disp: , Rfl:   •  Diclofenac Sodium (Pennsaid) 2 % solution, Pennsaid 20 mg/gram/actuation (2 %) topical soln in metered-dose pump, Disp: , Rfl:   •  diphenhydrAMINE-APAP, sleep, (TYLENOL PM EXTRA STRENGTH PO), Take  by mouth., Disp: , Rfl:   •  FLECTOR 1.3 % patch patch, APPLY 1 PATCH TO AFFECTED AREA(S) TWICE DAILY, Disp: , Rfl:   •  fluticasone (FLONASE) 50 MCG/ACT nasal spray, 2 sprays Daily., Disp: , Rfl:   •  neomycin-polymyxin-hydrocortisone (CORTISPORIN) 3.5-17066-8 otic solution, INSTILL 3 DROPS INTO AFFECTED EAR(S) 3 TO 4 TIMES DAILY, Disp: , Rfl:   •  PENNSAID 2 % solution, APPLY 2 PUMPS TO AFFECTED AREA(S) TWICE DAILY, Disp: , Rfl:   •  tadalafil (CIALIS) 20 MG tablet, TK 1 T PO DAILY 1 HOUR BEFORE NEEDED, Disp: , Rfl:   •  tiZANidine (ZANAFLEX) 4 MG tablet, Take 4 mg by mouth 3 (Three) Times a Day As Needed., Disp: , Rfl:   •  traMADol (ULTRAM) 50 MG tablet, Take 50 mg by mouth Every 8 (Eight) Hours., Disp: , Rfl:     ALLERGIES     Ibuprofen    FAMILY HISTORY     No family history on file.       SOCIAL  "HISTORY       Social History     Socioeconomic History   • Marital status:    Tobacco Use   • Smoking status: Former Smoker   • Smokeless tobacco: Never Used   Vaping Use   • Vaping Use: Never used   Substance and Sexual Activity   • Alcohol use: Never   • Drug use: Defer   • Sexual activity: Defer         PHYSICAL EXAM    (up to 7 for level 4, 8 or more for level 5)     Vitals:    12/18/21 0357 12/18/21 0358 12/18/21 0420 12/18/21 0442   BP:    139/85   Pulse: 73      Resp:   20    Temp:  97.6 °F (36.4 °C)     TempSrc:  Oral     SpO2: 98%      Weight:  122 kg (268 lb)     Height:  182.9 cm (72\")         Physical Exam  General : Patient is awake, alert, oriented, in acute distress, grabbing his left flank.  HEENT: Pupils are equally round and reactive to light, EOMI, conjunctivae clear, sclerae white  Neck: Neck is supple, full range of motion, trachea midline  Cardiac: Heart regular rate, rhythm, no murmurs, rubs, or gallops  Lungs: Lungs are clear to auscultation, there is no wheezing, rhonchi, or rales. There is no use of accessory muscles  Abdomen: Abdomen is soft, nondistended.  There is tenderness along the left flank, left CVA, there is no contusions or abrasions.  No peritoneal signs on abdominal examination.  Musculoskeletal: 5 out of 5 strength in all 4 extremities.  No focal muscle deficits are appreciated  Neuro: Motor intact, sensory intact, level of consciousness is normal  Dermatology: Skin is warm and dry  Psych: Mentation is grossly normal, cognition is grossly normal. Affect is appropriate.      DIAGNOSTIC RESULTS     EKG: All EKGs are interpreted by the Emergency Department Physician who either signs or Co-signs this chart in the absence of a cardiologist.    ECG 12 Lead   Final Result   Test Reason : abd pain   Blood Pressure :   */*   mmHG   Vent. Rate :  77 BPM     Atrial Rate :  76 BPM      P-R Int :   * ms          QRS Dur :  84 ms       QT Int : 392 ms       P-R-T Axes :   *  33  -6 " degrees      QTc Int : 443 ms      Accelerated Junctional rhythm   Cannot rule out Anterior infarct , age undetermined   Abnormal ECG   No previous ECGs available   Confirmed by SHAJI POWER MD ([id number]) on 12/18/2021 3:57:44 AM      Referred By: EDMD           Confirmed By: SHAJI POWER MD          RADIOLOGY:   Non-plain film images such as CT, Ultrasound and MRI are read by the radiologist. Plain radiographic images are visualized and preliminarily interpreted by the emergency physician with the below findings:      [] Radiologist's Report Reviewed:  CT Abdomen Pelvis Without Contrast   Final Result   4 mm distal left ureteral stone with mild left hydronephrosis otherwise normal               Signer Name: Gianni Bhatia MD    Signed: 12/18/2021 5:38 AM    Workstation Name: [facility]     [facility]            ED BEDSIDE ULTRASOUND:   Performed by ED Physician - none    LABS:    I have reviewed and interpreted all of the currently available lab results from this visit (if applicable):  Results for orders placed or performed during the hospital encounter of 12/18/21   Comprehensive Metabolic Panel    Specimen: Blood   Result Value Ref Range    Glucose 158 (H) 65 - 99 mg/dL    BUN 23 8 - 23 mg/dL    Creatinine 1.60 (H) 0.76 - 1.27 mg/dL    Sodium 141 136 - 145 mmol/L    Potassium 4.2 3.5 - 5.2 mmol/L    Chloride 103 98 - 107 mmol/L    CO2 22.0 22.0 - 29.0 mmol/L    Calcium 10.4 8.6 - 10.5 mg/dL    Total Protein 7.5 6.0 - 8.5 g/dL    Albumin 4.60 3.50 - 5.20 g/dL    ALT (SGPT) 12 1 - 41 U/L    AST (SGOT) 22 1 - 40 U/L    Alkaline Phosphatase 94 39 - 117 U/L    Total Bilirubin 0.4 0.0 - 1.2 mg/dL    eGFR Non African Amer 44 (L) >60 mL/min/1.73    Globulin 2.9 gm/dL    A/G Ratio 1.6 g/dL    BUN/Creatinine Ratio 14.4 7.0 - 25.0    Anion Gap 16.0 (H) 5.0 - 15.0 mmol/L   Urinalysis With Microscopic If Indicated (No Culture) - Urine, Clean Catch    Specimen: Urine, Clean Catch   Result Value Ref  Range    Color, UA Yellow Yellow, Straw    Appearance, UA Clear Clear    pH, UA 5.5 5.0 - 8.0    Specific Gravity, UA 1.020 1.001 - 1.030    Glucose, UA Negative Negative    Ketones, UA Trace (A) Negative    Bilirubin, UA Negative Negative    Blood, UA Small (1+) (A) Negative    Protein, UA Negative Negative    Leuk Esterase, UA Negative Negative    Nitrite, UA Negative Negative    Urobilinogen, UA 1.0 E.U./dL 0.2 - 1.0 E.U./dL   CBC Auto Differential    Specimen: Blood   Result Value Ref Range    WBC 12.93 (H) 3.40 - 10.80 10*3/mm3    RBC 4.83 4.14 - 5.80 10*6/mm3    Hemoglobin 13.8 13.0 - 17.7 g/dL    Hematocrit 41.2 37.5 - 51.0 %    MCV 85.3 79.0 - 97.0 fL    MCH 28.6 26.6 - 33.0 pg    MCHC 33.5 31.5 - 35.7 g/dL    RDW 12.9 12.3 - 15.4 %    RDW-SD 40.2 37.0 - 54.0 fl    MPV 12.2 (H) 6.0 - 12.0 fL    Platelets 201 140 - 450 10*3/mm3    Neutrophil % 87.4 (H) 42.7 - 76.0 %    Lymphocyte % 8.6 (L) 19.6 - 45.3 %    Monocyte % 2.7 (L) 5.0 - 12.0 %    Eosinophil % 0.5 0.3 - 6.2 %    Basophil % 0.4 0.0 - 1.5 %    Immature Grans % 0.4 0.0 - 0.5 %    Neutrophils, Absolute 11.30 (H) 1.70 - 7.00 10*3/mm3    Lymphocytes, Absolute 1.11 0.70 - 3.10 10*3/mm3    Monocytes, Absolute 0.35 0.10 - 0.90 10*3/mm3    Eosinophils, Absolute 0.07 0.00 - 0.40 10*3/mm3    Basophils, Absolute 0.05 0.00 - 0.20 10*3/mm3    Immature Grans, Absolute 0.05 0.00 - 0.05 10*3/mm3    nRBC 0.0 0.0 - 0.2 /100 WBC   Urinalysis, Microscopic Only - Urine, Clean Catch    Specimen: Urine, Clean Catch   Result Value Ref Range    RBC, UA 3-6 (A) None Seen, 0-2 /HPF    WBC, UA 0-2 None Seen, 0-2 /HPF    Bacteria, UA None Seen None Seen, Trace /HPF    Squamous Epithelial Cells, UA 0-2 None Seen, 0-2 /HPF    Hyaline Casts, UA 0-6 0 - 6 /LPF    Methodology Automated Microscopy    ECG 12 Lead   Result Value Ref Range    QT Interval 392 ms    QTC Interval 443 ms        All other labs were within normal range or not returned as of this dictation.      EMERGENCY  "DEPARTMENT COURSE and DIFFERENTIAL DIAGNOSIS/MDM:   Vitals:    Vitals:    12/18/21 0357 12/18/21 0358 12/18/21 0420 12/18/21 0442   BP:    139/85   Pulse: 73      Resp:   20    Temp:  97.6 °F (36.4 °C)     TempSrc:  Oral     SpO2: 98%      Weight:  122 kg (268 lb)     Height:  182.9 cm (72\")              Patient with sudden onset of left-sided flank pain, nausea vomiting about 1:00 this morning just a few hours prior to arrival.  He is having difficult time finding a position of comfort on arrival to the emergency department.  We did start the patient on IV fluids, pain and nausea control, CT scan imaging obtained.  Patient with mild renal insufficiency, CT scan with a 4 mm distal left ureteral stone.  Urinalysis with no signs of acute infection.  Patient was having difficulty achieving pain control with oral medication, IV medications, is given a dose of Flomax, he has a allergy to nonsteroidal medications.  On reevaluation after multiple IV pain medication, oral pain medication, Flomax, Zofran and monitoring for a few hours in the ED we are unable to control the patient's pain.  Secondary to this we will plan for admission/observation in the hospital for pain control, monitoring.  Case discussed with our hospitalist team, Dr. Doyle, for admission.          MEDICATIONS ADMINISTERED IN ED:  Medications   ondansetron (ZOFRAN) injection 4 mg (4 mg Intravenous Given 12/18/21 0752)   HYDROmorphone (DILAUDID) injection 1 mg (1 mg Intravenous Given 12/18/21 0557)   sodium chloride 0.9 % flush 10 mL (has no administration in time range)   oxyCODONE-acetaminophen (PERCOCET) 7.5-325 MG per tablet 1 tablet (1 tablet Oral Given 12/18/21 0733)   tamsulosin (FLOMAX) 24 hr capsule 0.4 mg (0.4 mg Oral Given 12/18/21 0733)   HYDROmorphone (DILAUDID) injection 1 mg (1 mg Intravenous Given 12/18/21 0753)       PROCEDURES:  Procedures    CRITICAL CARE TIME    Total Critical Care time was 0 minutes, excluding separately reportable " procedures.   There was a high probability of clinically significant/life threatening deterioration in the patient's condition which required my urgent intervention.      FINAL IMPRESSION      1. Left ureteral stone    2. Mild renal insufficiency    3. Uncontrolled pain          DISPOSITION/PLAN     ED Disposition     ED Disposition Condition Comment    Decision to Admit          Comment: Please note this report has been produced using speech recognition software.      Heber Cruz DO  Attending Emergency Physician               Heber Cruz,   12/18/21 0754

## 2021-12-19 VITALS
DIASTOLIC BLOOD PRESSURE: 94 MMHG | RESPIRATION RATE: 18 BRPM | WEIGHT: 268 LBS | BODY MASS INDEX: 36.3 KG/M2 | OXYGEN SATURATION: 95 % | HEART RATE: 71 BPM | SYSTOLIC BLOOD PRESSURE: 146 MMHG | TEMPERATURE: 98.4 F | HEIGHT: 72 IN

## 2021-12-19 DIAGNOSIS — N13.39 OTHER HYDRONEPHROSIS: Primary | ICD-10-CM

## 2021-12-19 LAB
ALBUMIN SERPL-MCNC: 4.2 G/DL (ref 3.5–5.2)
ALBUMIN/GLOB SERPL: 1.3 G/DL
ALP SERPL-CCNC: 76 U/L (ref 39–117)
ALT SERPL W P-5'-P-CCNC: 11 U/L (ref 1–41)
ANION GAP SERPL CALCULATED.3IONS-SCNC: 14 MMOL/L (ref 5–15)
AST SERPL-CCNC: 22 U/L (ref 1–40)
BASOPHILS # BLD AUTO: 0.02 10*3/MM3 (ref 0–0.2)
BASOPHILS NFR BLD AUTO: 0.2 % (ref 0–1.5)
BILIRUB SERPL-MCNC: 0.5 MG/DL (ref 0–1.2)
BUN SERPL-MCNC: 18 MG/DL (ref 8–23)
BUN/CREAT SERPL: 16.1 (ref 7–25)
CALCIUM SPEC-SCNC: 9.2 MG/DL (ref 8.6–10.5)
CHLORIDE SERPL-SCNC: 100 MMOL/L (ref 98–107)
CO2 SERPL-SCNC: 22 MMOL/L (ref 22–29)
CREAT SERPL-MCNC: 1.12 MG/DL (ref 0.76–1.27)
DEPRECATED RDW RBC AUTO: 43 FL (ref 37–54)
EOSINOPHIL # BLD AUTO: 0 10*3/MM3 (ref 0–0.4)
EOSINOPHIL NFR BLD AUTO: 0 % (ref 0.3–6.2)
ERYTHROCYTE [DISTWIDTH] IN BLOOD BY AUTOMATED COUNT: 13.3 % (ref 12.3–15.4)
GFR SERPL CREATININE-BSD FRML MDRD: 66 ML/MIN/1.73
GLOBULIN UR ELPH-MCNC: 3.2 GM/DL
GLUCOSE SERPL-MCNC: 136 MG/DL (ref 65–99)
HCT VFR BLD AUTO: 42.2 % (ref 37.5–51)
HGB BLD-MCNC: 13.6 G/DL (ref 13–17.7)
IMM GRANULOCYTES # BLD AUTO: 0.07 10*3/MM3 (ref 0–0.05)
IMM GRANULOCYTES NFR BLD AUTO: 0.5 % (ref 0–0.5)
LYMPHOCYTES # BLD AUTO: 0.74 10*3/MM3 (ref 0.7–3.1)
LYMPHOCYTES NFR BLD AUTO: 5.7 % (ref 19.6–45.3)
MCH RBC QN AUTO: 28.5 PG (ref 26.6–33)
MCHC RBC AUTO-ENTMCNC: 32.2 G/DL (ref 31.5–35.7)
MCV RBC AUTO: 88.3 FL (ref 79–97)
MONOCYTES # BLD AUTO: 0.33 10*3/MM3 (ref 0.1–0.9)
MONOCYTES NFR BLD AUTO: 2.6 % (ref 5–12)
NEUTROPHILS NFR BLD AUTO: 11.77 10*3/MM3 (ref 1.7–7)
NEUTROPHILS NFR BLD AUTO: 91 % (ref 42.7–76)
NRBC BLD AUTO-RTO: 0 /100 WBC (ref 0–0.2)
PLATELET # BLD AUTO: 183 10*3/MM3 (ref 140–450)
PMV BLD AUTO: 11.9 FL (ref 6–12)
POTASSIUM SERPL-SCNC: 4.2 MMOL/L (ref 3.5–5.2)
PROT SERPL-MCNC: 7.4 G/DL (ref 6–8.5)
RBC # BLD AUTO: 4.78 10*6/MM3 (ref 4.14–5.8)
SODIUM SERPL-SCNC: 136 MMOL/L (ref 136–145)
WBC NRBC COR # BLD: 12.93 10*3/MM3 (ref 3.4–10.8)

## 2021-12-19 PROCEDURE — 99225 PR SBSQ OBSERVATION CARE/DAY 25 MINUTES: CPT | Performed by: UROLOGY

## 2021-12-19 PROCEDURE — G0378 HOSPITAL OBSERVATION PER HR: HCPCS

## 2021-12-19 PROCEDURE — 25010000002 PROCHLORPERAZINE 10 MG/2ML SOLUTION: Performed by: UROLOGY

## 2021-12-19 PROCEDURE — 25010000002 HYDROMORPHONE PER 4 MG: Performed by: UROLOGY

## 2021-12-19 PROCEDURE — 80053 COMPREHEN METABOLIC PANEL: CPT | Performed by: UROLOGY

## 2021-12-19 PROCEDURE — 99217 PR OBSERVATION CARE DISCHARGE MANAGEMENT: CPT | Performed by: INTERNAL MEDICINE

## 2021-12-19 PROCEDURE — 85025 COMPLETE CBC W/AUTO DIFF WBC: CPT | Performed by: UROLOGY

## 2021-12-19 RX ORDER — PHENAZOPYRIDINE HYDROCHLORIDE 200 MG/1
200 TABLET, FILM COATED ORAL 3 TIMES DAILY PRN
Qty: 12 TABLET | Refills: 0 | Status: SHIPPED | OUTPATIENT
Start: 2021-12-19 | End: 2023-01-12 | Stop reason: HOSPADM

## 2021-12-19 RX ORDER — ONDANSETRON 4 MG/1
4 TABLET, ORALLY DISINTEGRATING ORAL EVERY 8 HOURS PRN
Qty: 12 TABLET | Refills: 0 | Status: SHIPPED | OUTPATIENT
Start: 2021-12-19

## 2021-12-19 RX ORDER — TAMSULOSIN HYDROCHLORIDE 0.4 MG/1
0.4 CAPSULE ORAL DAILY
Qty: 30 CAPSULE | Refills: 0 | Status: SHIPPED | OUTPATIENT
Start: 2021-12-20

## 2021-12-19 RX ORDER — NITROFURANTOIN 25; 75 MG/1; MG/1
100 CAPSULE ORAL NIGHTLY
Qty: 5 CAPSULE | Refills: 0 | Status: SHIPPED | OUTPATIENT
Start: 2021-12-19 | End: 2023-01-12 | Stop reason: HOSPADM

## 2021-12-19 RX ADMIN — HYDROMORPHONE HYDROCHLORIDE 1 MG: 1 INJECTION, SOLUTION INTRAMUSCULAR; INTRAVENOUS; SUBCUTANEOUS at 08:22

## 2021-12-19 RX ADMIN — OXYCODONE HYDROCHLORIDE AND ACETAMINOPHEN 1 TABLET: 10; 325 TABLET ORAL at 04:20

## 2021-12-19 RX ADMIN — TAMSULOSIN HYDROCHLORIDE 0.4 MG: 0.4 CAPSULE ORAL at 08:22

## 2021-12-19 RX ADMIN — DOCUSATE SODIUM 50 MG AND SENNOSIDES 8.6 MG 2 TABLET: 8.6; 5 TABLET, FILM COATED ORAL at 08:23

## 2021-12-19 RX ADMIN — OXYCODONE HYDROCHLORIDE AND ACETAMINOPHEN 1 TABLET: 10; 325 TABLET ORAL at 00:20

## 2021-12-19 RX ADMIN — SODIUM CHLORIDE, PRESERVATIVE FREE 10 ML: 5 INJECTION INTRAVENOUS at 08:23

## 2021-12-19 RX ADMIN — PROCHLORPERAZINE EDISYLATE 2.5 MG: 5 INJECTION INTRAMUSCULAR; INTRAVENOUS at 08:22

## 2021-12-19 NOTE — PROGRESS NOTES
[facility]   Urology Progress Note    Patient Name: Joseph Garcia  : 1960  MRN: [id number]  Primary Care Physician:  Brandon Wei MD  Date of admission: 2021    Subjective   Subjective     Chief Complaint: Flank Pain     HPI:  Afebrile and hemodynamically stable.  Flank pain improved.  Does report mild dysuria associated with stent.    Review of Systems   The following systems were reviewed and negative;  constitution, respiratory, cardiovascular, gastrointestinal, genitourinary, musculoskeletal, neurological and behavioral/psych.    Objective   Objective     Vitals:   Temp:  [96.4 °F (35.8 °C)-98.7 °F (37.1 °C)] 98.7 °F (37.1 °C)  Heart Rate:  [68-94] 79  Resp:  [18-24] 18  BP: (118-168)/() 136/89  Flow (L/min):  [2-15] 2  Physical Exam    Constitutional: Awake in bed, alert   Eyes: PERRLA, sclerae anicteric, no conjunctival injection   HENT: Normocephalic, atraumatic, mucous membranes moist   Neck: Supple, trachea midline   Respiratory: Equal chest rise, non-labored respirations    Cardiovascular: Well-perfused, no edema   Gastrointestinal: Soft, nontender, non-distended   Musculoskeletal: No lower extremity edema bilaterally, no clubbing or cyanosis to extremities   Psychiatric: Appropriate affect, cooperative   Neurologic: Oriented x 3,  Cranial Nerves grossly intact, speech clear   Skin: No rashes     Result Review    Result Review:  I have personally reviewed the results from the time of this admission to 2021 10:19 EST and agree with these findings:  [x]  Laboratory  [x]  Microbiology  []  Radiology  []  EKG/Telemetry   []  Cardiology/Vascular   []  Pathology  [x]  Old records  []  Other:    Most notable findings include:   WBC 12.9  Creatinine 1.1    Assessment/Plan   Assessment / Plan     Brief Patient Summary:  Joseph Garcia is a 61 y.o. male who presented to [facility] ED 2021 with acute left flank pain.  Patient taken to the operating room 2021 for left  ureteroscopy and laser lithotripsy.  Ureteral stent in place that is on a string.  Patient may remove ureteral stent on string 12/22/2021.  He was instructed on how to do this correctly.  He was instructed that ureteral stents are not permanent and must be removed.  Discussed stone risk strategies today.  This is the patient's fist stone episode.  Patient will return in 4 weeks with a renal ultrasound.  Urology clinic will contact to schedule.    Active Hospital Problems:  Active Hospital Problems    Diagnosis    • **Intractable pain    • Ureteral stone    • MAGALY (acute kidney injury) (HCC)    • Left ureteral stone        Plan:     - Appropriate for discharge from urologic standpoint.  Please discharged with Flomax, Pyridium, Macrobid through 12/23/2021.  - Patient will return in 4 weeks with a renal ultrasound.  Urology clinic will contact to schedule.         DVT prophylaxis:  Mechanical DVT prophylaxis orders are present.    CODE STATUS:   Level Of Support Discussed With: Patient  Code Status (Patient has no pulse and is not breathing): CPR (Attempt to Resuscitate)  Medical Interventions (Patient has pulse or is breathing): Full Support    Disposition:  I expect patient to discharge 12/19/2021    Electronically signed by Rick Hargrove MD, 12/19/21, 10:19 AM EST.

## 2021-12-19 NOTE — DISCHARGE SUMMARY
[facility] Medicine Services  DISCHARGE SUMMARY    Patient Name: Joseph Garcia  : 1960  MRN: [id number]    Date of Admission: 2021  3:50 AM  Date of Discharge: 2021  Primary Care Physician: Brandon Wei MD    Consults     Date and Time Order Name Status Description    2021  9:48 AM Inpatient Urology Consult Completed           Hospital Course     Presenting Problem:   Left ureteral stone [N20.1]    Active Hospital Problems    Diagnosis  POA   • **Intractable pain [R52]  Yes   • Ureteral stone [N20.1]  Yes   • MAGALY (acute kidney injury) (HCC) [N17.9]  Yes   • Left ureteral stone [N20.1]  Yes      Resolved Hospital Problems   No resolved problems to display.          Hospital Course:  Joseph Garcia is a 61 y.o. male with dyslipidemia, obesity who presented to the hospital with acute onset flank pain, found to have LT distal ureteral stone with hydroureter.  On 2021 he underwent cystoscopy, ureteroscopy, laser lithotripsy with basket extraction and ureteral stent placement with Dr. Hargrove.  Is discharging home on Flomax, Pyridium, and several days of Macrobid per urology recommendations, he will pull his stent in 3 days and follow-up with urology in 4 weeks.  He did have an A1c of 6.2% on arrival c/w prediabetes.      Discharge Follow Up Recommendations for outpatient labs/diagnostics:  PCP 2-3 weeks  Dr. Hargrove, urology, 4 weeks    Day of Discharge     HPI:   Still remains groggy from sedation yesterday.  Has some nausea and is asking for something at home.  Has some burning in his urethra associated with the stent    Review of Systems  Gen- No fevers, chills  CV- No chest pain, palpitations  Resp- No cough, dyspnea  GI- No V/D, abd pain; yes nausea    Vital Signs:   Temp:  [96.4 °F (35.8 °C)-98.7 °F (37.1 °C)] 98.7 °F (37.1 °C)  Heart Rate:  [68-94] 79  Resp:  [18-24] 18  BP: (118-168)/() 136/89     Physical Exam:  Constitutional: No acute distress, awake,  lethargic, laying in bed  HENT: NCAT, mucous membranes moist  Respiratory: Clear to auscultation bilaterally, respiratory effort normal   Cardiovascular: RRR, no murmurs, rubs, or gallops  Gastrointestinal: Positive bowel sounds, soft, nontender, nondistended  Musculoskeletal: No bilateral ankle edema  Psychiatric: Appropriate affect, cooperative  Neurologic: Speech clear    Pertinent  and/or Most Recent Results     LAB RESULTS:      Lab 12/19/21  0355 12/18/21  0500   WBC 12.93* 12.93*   HEMOGLOBIN 13.6 13.8   HEMATOCRIT 42.2 41.2   PLATELETS 183 201   NEUTROS ABS 11.77* 11.30*   IMMATURE GRANS (ABS) 0.07* 0.05   LYMPHS ABS 0.74 1.11   MONOS ABS 0.33 0.35   EOS ABS 0.00 0.07   MCV 88.3 85.3   PROCALCITONIN  --  0.03         Lab 12/19/21  0355 12/18/21  0500   SODIUM 136 141   POTASSIUM 4.2 4.2   CHLORIDE 100 103   CO2 22.0 22.0   ANION GAP 14.0 16.0*   BUN 18 23   CREATININE 1.12 1.60*   GLUCOSE 136* 158*   CALCIUM 9.2 10.4   HEMOGLOBIN A1C  --  6.20*         Lab 12/19/21  0355 12/18/21  0500   TOTAL PROTEIN 7.4 7.5   ALBUMIN 4.20 4.60   GLOBULIN 3.2 2.9   ALT (SGPT) 11 12   AST (SGOT) 22 22   BILIRUBIN 0.5 0.4   ALK PHOS 76 94                     Brief Urine Lab Results  (Last result in the past 365 days)      Color   Clarity   Blood   Leuk Est   Nitrite   Protein   CREAT   Urine HCG        12/18/21 0637 Yellow   Clear   Small (1+)   Negative   Negative   Negative               Microbiology Results (last 10 days)     Procedure Component Value - Date/Time    COVID PRE-OP / PRE-PROCEDURE SCREENING ORDER (NO ISOLATION) - Swab, Nasopharynx [[id number]]  (Normal) Collected: 12/18/21 0827    Lab Status: Final result Specimen: Swab from Nasopharynx Updated: 12/18/21 0919    Narrative:      The following orders were created for panel order COVID PRE-OP / PRE-PROCEDURE SCREENING ORDER (NO ISOLATION) - Swab, Nasopharynx.  Procedure                               Abnormality         Status                     ---------                                -----------         ------                     COVID-19 and FLU A/B PCR...[[id number]]  Normal              Final result                 Please view results for these tests on the individual orders.    COVID-19 and FLU A/B PCR - Swab, Nasopharynx [[id number]]  (Normal) Collected: 12/18/21 0827    Lab Status: Final result Specimen: Swab from Nasopharynx Updated: 12/18/21 0919     COVID19 Not Detected     Influenza A PCR Not Detected     Influenza B PCR Not Detected    Narrative:      Fact sheet for providers: https://www.fda.gov/media/090935/download    Fact sheet for patients: https://www.fda.gov/media/971569/download    Test performed by PCR.          CT Abdomen Pelvis Without Contrast    Result Date: 12/18/2021  CT Abdomen Pelvis WO INDICATION: Left flank pain this morning with nausea and vomiting TECHNIQUE: CT of the abdomen and pelvis without IV contrast. Coronal and sagittal reconstructions were obtained.  Radiation dose reduction techniques included automated exposure control or exposure modulation based on body size. Count of known CT and cardiac nuc med studies performed in previous 12 months: 0. COMPARISON: None available. FINDINGS: Abdomen: Lung bases are clear. Liver, gallbladder, spleen, pancreas and adrenal glands are normal. Right kidney is normal. Left kidney shows mild hydronephrosis. Left ureter slightly dilated and there is a distal left ureteral stone measuring 4 mm in diameter. Aorta is normal in size. There is no adenopathy. The bowel is normal. Pelvis: Bladder and prostate gland are normal. Bones are unremarkable.     4 mm distal left ureteral stone with mild left hydronephrosis otherwise normal Signer Name: Gianni Bhatia MD  Signed: 12/18/2021 5:38 AM  Workstation Name: [email or web address]  [facility] C Arm During Surgery    Result Date: 12/19/2021  EXAMINATION: FL C ARM DURING SURGERY-  INDICATION: pain; N20.1-Calculus of ureter; N28.9-Disorder of  kidney and ureter, unspecified; R52-Pain, unspecified; N20.1-Calculus of ureter  TECHNIQUE: Intraprocedural fluoroscopic images of the renal collecting system. 24 images saved. Fluoroscopy time: 24 seconds.  COMPARISON: NONE  FINDINGS:  Intraprocedural fluoroscopic images of the renal collecting system. 24 images saved. Fluoroscopy time: 24 seconds.       Intraprocedural fluoroscopic images of the renal collecting system. 24 images saved. Fluoroscopy time: 24 seconds. Please refer to procedural note for details.   This report was finalized on 12/19/2021 6:52 AM by Luis Means MD.        Pending Labs     Order Current Status    STONE ANALYSIS - Calculus, Kidney, Left In process        Discharge Details        Discharge Medications      New Medications      Instructions Start Date   nitrofurantoin (macrocrystal-monohydrate) 100 MG capsule  Commonly known as: Macrobid   100 mg, Oral, Nightly      ondansetron ODT 4 MG disintegrating tablet  Commonly known as: Zofran ODT   4 mg, Translingual, Every 8 Hours PRN      phenazopyridine 200 MG tablet  Commonly known as: Pyridium   200 mg, Oral, 3 Times Daily PRN      tamsulosin 0.4 MG capsule 24 hr capsule  Commonly known as: FLOMAX   0.4 mg, Oral, Daily   Start Date: December 20, 2021        Continue These Medications      Instructions Start Date   atorvastatin 40 MG tablet  Commonly known as: LIPITOR   40 mg, Oral, Daily      Cambia 50 MG pack  Generic drug: Diclofenac Potassium(Migraine)   TAKE 1 PACKET BY MOUTH AT ONSET OF HEADACHE ONCE DAILY      cetirizine 10 MG tablet  Commonly known as: zyrTEC   10 mg, Oral, Every Night at Bedtime      Flector 1.3 % patch patch  Generic drug: Diclofenac Epolamine   APPLY 1 PATCH TO AFFECTED AREA(S) TWICE DAILY      fluticasone 50 MCG/ACT nasal spray  Commonly known as: FLONASE   2 sprays, Daily      neomycin-polymyxin-hydrocortisone 3.5-58594-7 otic solution  Commonly known as: CORTISPORIN   INSTILL 3 DROPS INTO AFFECTED EAR(S) 3  TO 4 TIMES DAILY      Pennsaid 2 % solution  Generic drug: Diclofenac Sodium   Pennsaid 20 mg/gram/actuation (2 %) topical soln in metered-dose pump      Pennsaid 2 % solution  Generic drug: Diclofenac Sodium   APPLY 2 PUMPS TO AFFECTED AREA(S) TWICE DAILY      tadalafil 20 MG tablet  Commonly known as: CIALIS   TK 1 T PO DAILY 1 HOUR BEFORE NEEDED      tiZANidine 4 MG tablet  Commonly known as: ZANAFLEX   4 mg, Oral, 3 Times Daily PRN      traMADol 50 MG tablet  Commonly known as: ULTRAM   50 mg, Oral, Every 8 Hours      TYLENOL PM EXTRA STRENGTH PO   Oral         Stop These Medications    amoxicillin-clavulanate 875-125 MG per tablet  Commonly known as: AUGMENTIN            Allergies   Allergen Reactions   • Ibuprofen Hives         Discharge Disposition:  Home or Self Care    Diet:  Hospital:  Diet Order   Procedures   • Diet Clear Liquid          CODE STATUS:    Code Status and Medical Interventions:   Ordered at: 12/18/21 0816     Level Of Support Discussed With:    Patient     Code Status (Patient has no pulse and is not breathing):    CPR (Attempt to Resuscitate)     Medical Interventions (Patient has pulse or is breathing):    Full Support       No future appointments.    Additional Instructions for the Follow-ups that You Need to Schedule     Discharge Follow-up with PCP   As directed       Currently Documented PCP:    Brandon Wei MD    PCP Phone Number:    [phone]     Follow Up Details: 2-3 weeks         Discharge Follow-up with Specified Provider: Dr. Hargrove, urology - 4 weeks   As directed      To: Dr. Hargrove, urology - 4 weeks                     Benitez Casas DO  12/19/21      Time Spent on Discharge:  I spent  20  minutes on this discharge activity which included: face-to-face encounter with the patient, reviewing the data in the system, coordination of the care with the nursing staff as well as consultants, documentation, and entering orders.

## 2021-12-19 NOTE — PLAN OF CARE
Problem: Hypertension Comorbidity  Goal: Blood Pressure in Desired Range  Intervention: Maintain Hypertension-Management Strategies  Description: Evaluate adherence to home antihypertensive regimen (e.g., exercise and activity, diet modification, medication).  Provide scheduled antihypertensive medication; consider administration time and effects (e.g., avoid giving diuretic prior to bedtime).  Monitor response to medication therapy (e.g., blood pressure, electrolyte level, medication effects).  Minimize risk of orthostatic hypotension; encourage caution with position changes, particularly if elderly  Recent Flowsheet Documentation  Taken 12/18/2021 2000 by Winston Sheldon, RN  Medication Review/Management:   medications reviewed   high-risk medications identified     Problem: Pain Chronic (Persistent) (Comorbidity Management)  Goal: Acceptable Pain Control and Functional Ability  Intervention: Develop Pain Management Plan  Description: Acknowledge patient as the expert in pain self-management.  Use a consistent, validated tool for pain assessment.  Set pain management goals; determine acceptable level of discomfort to allow for maximal functioning and quality of life.  Determine [id number] pain management plan, including both pharmacologic and nonpharmacologic measures.  Identify and integrate past successful treatment measures, if able.  Encourage patient and caregiver involvement in pain assessment, interventions and safety measures.  Reevaluate plan regularly.  Recent Flowsheet Documentation  Taken 12/18/2021 2019 by Winston Sheldon, RN  Pain Management Interventions:   see MAR   quiet environment facilitated   relaxation techniques promoted  Taken 12/18/2021 2000 by Winston Sheldon, RN  Pain Management Interventions:   see MAR   quiet environment facilitated   relaxation techniques promoted  Intervention: Manage Persistent Pain  Description: Evaluate pain level, effect of treatment and patient response at  regular intervals.  Note: Response to pain may diminish over time. This does not indicate that pain is absent.  Minimize pain stimuli; coordinate care and adjust environment (e.g., light, noise, unnecessary movement); promote sleep/rest.  Match pharmacologic analgesia to severity and type of pain mechanism (e.g., neuropathic, muscle, inflammatory); consider multimodal approach (e.g., nonopioid, opioid, adjuvant).  Provide medication at regular intervals; titrate to patient response.  Manage breakthrough pain with additional doses; consider rotation or switching medication.  Monitor for signs of substance tolerance (increased dose to reach desired effect, decreased effect with same dose).  Avoid abrupt withdrawal of medication, especially agents capable of causing physical dependence.  Manage medication-induced effects, such as constipation, nausea, urinary retention, somnolence and dizziness.  Consider nonpharmacologic pain interventions to improve function (e.g., massage, transcutaneous electrical nerve stimulation, vibration, heat or cold application, immobilization, hydrotherapy).  Consider addition of complementary or alternative therapy, such as acupuncture, hypnosis or therapeutic touch.  Recent Flowsheet Documentation  Taken 12/18/2021 2000 by Winston Sheldon, RN  Bowel Elimination Promotion:   adequate fluid intake promoted   ambulation promoted   privacy promoted  Medication Review/Management:   medications reviewed   high-risk medications identified  Intervention: Optimize Psychosocial Wellbeing  Description: Facilitate patient’s self-control over pain by providing pain information and allowing choices in treatment.  Consider and address emotional response to pain.  Explore and promote use of coping strategies; address barriers to successful coping.  Evaluate and assist with psychosocial, cultural and spiritual factors impacting pain.  Modify pain perception by using cognitive-behavioral techniques, such as  distraction, guided imagery, meditation, relaxation or music.  Recent Flowsheet Documentation  Taken 12/18/2021 2000 by Winston Sheldon RN  Supportive Measures:   active listening utilized   positive reinforcement provided   problem-solving facilitated   relaxation techniques promoted   self-care encouraged   self-reflection promoted   self-responsibility promoted   verbalization of feelings encouraged  Diversional Activities:   television   smartphone     Problem: Fall Injury Risk  Goal: Absence of Fall and Fall-Related Injury  Outcome: Ongoing, Progressing  Intervention: Identify and Manage Contributors to Fall Injury Risk  Description: Reassess fall risk frequently and with change in status or transfer to another level of care.  Communicate fall injury risk to all healthcare team members (e.g., rounds, change of shift/provider, patient transport).  Anticipate needs; perform regular intentional rounding to assess need for position change, pain assessment, personal needs (e.g., toileting) and placement of necessary items.  Provide reorientation, appropriate sensory stimulation and routines with changes in mental status to decrease risk of fall.  Promote use of personal vision and auditory aids (e.g., glasses, hearing aids).  Assess assistance level required for safe and effective care; provide support as needed (e.g., toileting, bathing, mobilization).  Define behavior and activity limits to patient and family.  If fall occurs, assess for and treat injury; determine cause; revise fall injury prevention plan.  Regularly review medication contribution to fall risk; adjust medication administration times to minimize risk of falling.  Consider risk related to polypharmacy and age.  Balance adequate pain management with potential for oversedation.  Flowsheets  Taken 12/19/2021 0014  Self-Care Promotion:   independence encouraged   BADL personal objects within reach  Taken 12/18/2021 2000  Medication Review/Management:    medications reviewed   high-risk medications identified  Intervention: Promote Injury-Free Environment  Description: Provide a safe, barrier-free environment that encourages independent activity.  Keep care area uncluttered and well-lighted.  Determine need for increased observation or auditory alerts (e.g., bed or chair alarm).  Assess equipment and environmental modification needs (e.g., low bed, signage, nonskid footwear, grab bars).  Avoid use of restraints.  Flowsheets  Taken 12/19/2021 0000  Safety Promotion/Fall Prevention:   activity supervised   assistive device/personal items within reach   clutter free environment maintained   elopement precautions   fall prevention program maintained   nonskid shoes/slippers when out of bed   safety round/check completed  Taken 12/18/2021 2200  Safety Promotion/Fall Prevention:   activity supervised   assistive device/personal items within reach   clutter free environment maintained   elopement precautions   fall prevention program maintained   nonskid shoes/slippers when out of bed   safety round/check completed  Taken 12/18/2021 2000  Safety Promotion/Fall Prevention:   activity supervised   assistive device/personal items within reach   clutter free environment maintained   elopement precautions   fall prevention program maintained   nonskid shoes/slippers when out of bed   safety round/check completed     Problem: Adult Inpatient Plan of Care  Goal: Absence of Hospital-Acquired Illness or Injury  Intervention: Identify and Manage Fall Risk  Description: Perform standard risk assessment with a validated tool or comprehensive approach appropriate to the patient on admission; reassess fall risk frequently, with change in status or transfer to another level of care.  Communicate fall injury risk to interprofessional healthcare team.  Determine need for increased observation, equipment and environmental modification, such as low bed and signage, as well as supportive,  nonskid footwear.  Adjust safety measures to individual developmental age, stage and identified risk factors.  Reinforce the importance of safety and physical activity with patient and family.  Perform regular intentional rounding to assess need for position change, pain assessment, personal needs, including assistance with toileting.  Recent Flowsheet Documentation  Taken 12/19/2021 0000 by Winston Sheldon, RN  Safety Promotion/Fall Prevention:   activity supervised   assistive device/personal items within reach   clutter free environment maintained   elopement precautions   fall prevention program maintained   nonskid shoes/slippers when out of bed   safety round/check completed  Taken 12/18/2021 2200 by Winston Sheldon, RN  Safety Promotion/Fall Prevention:   activity supervised   assistive device/personal items within reach   clutter free environment maintained   elopement precautions   fall prevention program maintained   nonskid shoes/slippers when out of bed   safety round/check completed  Taken 12/18/2021 2000 by Winston Sheldon RN  Safety Promotion/Fall Prevention:   activity supervised   assistive device/personal items within reach   clutter free environment maintained   elopement precautions   fall prevention program maintained   nonskid shoes/slippers when out of bed   safety round/check completed  Intervention: Prevent Skin Injury  Description: Assess skin risk on admission and at regular intervals throughout hospital stay.  Keep all areas of skin (especially folds) clean and dry.  Maintain adequate skin hydration.  Relieve and redistribute pressure and protect bony prominences; implement measures based on patient-specific risk factors.  Match turning and repositioning schedule to clinical condition.  Encourage weight shift frequently; assist with reposition if unable to complete independently.  Float heels off bed. Avoid pressure on the Achilles tendon.  Keep skin free from extended contact with medical  devices.  Use aids (e.g., slide boards, mechanical lift) during transfer.  Recent Flowsheet Documentation  Taken 12/19/2021 0000 by Winston Sheldon RN  Body Position: position changed independently  Taken 12/18/2021 2200 by Winston Sheldon RN  Body Position: position changed independently  Taken 12/18/2021 2000 by Winston Sheldon RN  Body Position: position changed independently  Skin Protection:   adhesive use limited   protective footwear used   incontinence pads utilized   skin-to-skin areas padded   tubing/devices free from skin contact  Intervention: Prevent and Manage VTE (venous thromboembolism) Risk  Description: Assess for VTE risk.  Encourage/assist with early ambulation.  Initiate and maintain compression or other therapy, as indicated based on identified risk in accordance with organizational protocol/provider order.  Encourage both active and passive leg exercises while in bed, if unable to ambulate.  Recent Flowsheet Documentation  Taken 12/19/2021 0000 by Winston Sheldon RN  VTE Prevention/Management:   bilateral   dorsiflexion/plantar flexion performed  Taken 12/18/2021 2000 by Winston Sheldon RN  VTE Prevention/Management:   bilateral   dorsiflexion/plantar flexion performed   bleeding risk factor(s) identified   sequential compression devices off   patient refused intervention  Intervention: Prevent Infection  Description: Maintain skin and mucous membrane integrity; promote hand, oral and pulmonary hygiene.  Optimize fluid balance, nutrition, sleep and glycemic control to maximize infection resistance.  Identify potential sources of infection early to prevent or mitigate progression of infection (e.g., wound, lines, devices).  Evaluate ongoing need for invasive devices; remove promptly when no longer indicated.  Recent Flowsheet Documentation  Taken 12/18/2021 2000 by Winston Sheldon RN  Infection Prevention:   environmental surveillance performed   hand hygiene promoted   rest/sleep promoted   single patient room  provided  Goal: Optimal Comfort and Wellbeing  Intervention: Provide Person-Centered Care  Description: Use a family-focused approach to care.  Develop trust and rapport by proactively providing information, encouraging questions, addressing concerns and offering reassurance.  Acknowledge emotional response to hospitalization.  Recognize and utilize personal coping strategies.  Honor spiritual and cultural preferences.  Recent Flowsheet Documentation  Taken 12/18/2021 2000 by Winston Sheldon RN  Trust Relationship/Rapport:   care explained   choices provided   emotional support provided   empathic listening provided   questions answered   questions encouraged   reassurance provided   thoughts/feelings acknowledged   Goal Outcome Evaluation:

## 2021-12-27 LAB
CALCIUM OXALATE DIHYDRATE MFR STONE IR: 10 %
COLOR STONE: NORMAL
COM MFR STONE: 90 %
COMPN STONE: NORMAL
LABORATORY COMMENT REPORT: NORMAL
Lab: NORMAL
Lab: NORMAL
PHOTO: NORMAL
SIZE STONE: NORMAL MM
SPEC SOURCE SUBJ: NORMAL
WT STONE: 16 MG

## 2021-12-28 ENCOUNTER — HOSPITAL ENCOUNTER (OUTPATIENT)
Dept: ULTRASOUND IMAGING | Facility: HOSPITAL | Age: 61
Discharge: HOME | End: 2021-12-28
Admitting: UROLOGY

## 2021-12-28 DIAGNOSIS — N13.39 OTHER HYDRONEPHROSIS: ICD-10-CM

## 2021-12-28 PROCEDURE — 76775 US EXAM ABDO BACK WALL LIM: CPT

## 2022-01-20 ENCOUNTER — OFFICE VISIT (OUTPATIENT)
Dept: UROLOGY | Facility: CLINIC | Age: 62
End: 2022-01-20

## 2022-01-20 VITALS — HEIGHT: 72 IN | WEIGHT: 268 LBS | BODY MASS INDEX: 36.3 KG/M2

## 2022-01-20 DIAGNOSIS — R39.9 LOWER URINARY TRACT SYMPTOMS (LUTS): ICD-10-CM

## 2022-01-20 DIAGNOSIS — N20.1 URETERAL STONE: Primary | ICD-10-CM

## 2022-01-20 DIAGNOSIS — N20.1 LEFT URETERAL STONE: ICD-10-CM

## 2022-01-20 LAB
BILIRUB BLD-MCNC: NEGATIVE MG/DL
CLARITY, POC: CLEAR
COLOR UR: YELLOW
EXPIRATION DATE: NORMAL
GLUCOSE UR STRIP-MCNC: NEGATIVE MG/DL
KETONES UR QL: NEGATIVE
LEUKOCYTE EST, POC: NEGATIVE
Lab: NORMAL
NITRITE UR-MCNC: NEGATIVE MG/ML
PH UR: 6 [PH] (ref 5–8)
PROT UR STRIP-MCNC: NEGATIVE MG/DL
RBC # UR STRIP: NEGATIVE /UL
SP GR UR: 1.02 (ref 1–1.03)
UROBILINOGEN UR QL: NORMAL

## 2022-01-20 PROCEDURE — 81003 URINALYSIS AUTO W/O SCOPE: CPT | Performed by: UROLOGY

## 2022-01-20 PROCEDURE — 99214 OFFICE O/P EST MOD 30 MIN: CPT | Performed by: UROLOGY

## 2022-01-20 NOTE — PROGRESS NOTES
Follow Up Office Visit      Patient Name: Joseph Garcia  : 1960   MRN: [id number]     Chief Complaint:    Chief Complaint   Patient presents with   • Post op       History of Present Illness: Joseph Garcia is a 61 y.o. male who presents today for 4-week postoperative follow-up after ureteroscopy and laser lithotripsy for a distal left ureteral stone.  He reports removing stent without difficulty.  Denies current flank pain.  Denies recurrence of symptoms associated with stone.  Does report mild baseline lower urinary tract symptoms.  Denies hematuria.  Denies dysuria.  Denies history of urinary tract infection.    Subjective      Review of System: Review of Systems   Constitutional: Negative.  Negative for chills, fatigue, fever and unexpected weight change.   HENT: Negative.  Negative for sore throat.    Eyes: Negative.  Negative for visual disturbance.   Respiratory: Negative.  Negative for cough, chest tightness and shortness of breath.    Cardiovascular: Negative.  Negative for chest pain and leg swelling.   Gastrointestinal: Negative.  Negative for blood in stool, constipation, diarrhea, nausea, rectal pain and vomiting.   Genitourinary: Negative.  Negative for decreased urine volume, difficulty urinating, dysuria, enuresis, flank pain, frequency, genital sores, hematuria and urgency.   Musculoskeletal: Negative.  Negative for back pain and joint swelling.   Skin: Negative.  Negative for rash and wound.   Neurological: Negative.  Negative for seizures, speech difficulty, weakness and headaches.   Hematological: Negative.    Psychiatric/Behavioral: Negative.  Negative for confusion, sleep disturbance and suicidal ideas. The patient is not nervous/anxious.    All other systems reviewed and are negative.     I have reviewed the ROS documented by my clinical staff, updated as appropriate and I agree. Essence Carmichael MA    I have reviewed and the following portions of the patient's history were updated as  appropriate: past family history, past medical history, past social history, past surgical history and problem list.    Medications:     Current Outpatient Medications:   •  Aspirin 81 MG capsule, Take 81 mg by mouth Daily., Disp: , Rfl:   •  atorvastatin (LIPITOR) 40 MG tablet, Take 40 mg by mouth Daily., Disp: , Rfl:   •  CAMBIA 50 MG pack, TAKE 1 PACKET BY MOUTH AT ONSET OF HEADACHE ONCE DAILY, Disp: , Rfl:   •  cetirizine (zyrTEC) 10 MG tablet, Take 10 mg by mouth every night at bedtime., Disp: , Rfl:   •  Diclofenac Sodium (Pennsaid) 2 % solution, Pennsaid 20 mg/gram/actuation (2 %) topical soln in metered-dose pump, Disp: , Rfl:   •  diphenhydrAMINE-APAP, sleep, (TYLENOL PM EXTRA STRENGTH PO), Take  by mouth., Disp: , Rfl:   •  FLECTOR 1.3 % patch patch, APPLY 1 PATCH TO AFFECTED AREA(S) TWICE DAILY, Disp: , Rfl:   •  fluticasone (FLONASE) 50 MCG/ACT nasal spray, 2 sprays Daily., Disp: , Rfl:   •  neomycin-polymyxin-hydrocortisone (CORTISPORIN) 3.5-14481-7 otic solution, INSTILL 3 DROPS INTO AFFECTED EAR(S) 3 TO 4 TIMES DAILY, Disp: , Rfl:   •  nitrofurantoin, macrocrystal-monohydrate, (Macrobid) 100 MG capsule, Take 1 capsule by mouth Every Night., Disp: 5 capsule, Rfl: 0  •  ondansetron ODT (Zofran ODT) 4 MG disintegrating tablet, Place 1 tablet on the tongue Every 8 (Eight) Hours As Needed for Nausea or Vomiting., Disp: 12 tablet, Rfl: 0  •  PENNSAID 2 % solution, APPLY 2 PUMPS TO AFFECTED AREA(S) TWICE DAILY, Disp: , Rfl:   •  phenazopyridine (Pyridium) 200 MG tablet, Take 1 tablet by mouth 3 (Three) Times a Day As Needed for Bladder Spasms., Disp: 12 tablet, Rfl: 0  •  tadalafil (CIALIS) 20 MG tablet, TK 1 T PO DAILY 1 HOUR BEFORE NEEDED, Disp: , Rfl:   •  tamsulosin (FLOMAX) 0.4 MG capsule 24 hr capsule, Take 1 capsule by mouth Daily., Disp: 30 capsule, Rfl: 0  •  tiZANidine (ZANAFLEX) 4 MG tablet, Take 4 mg by mouth 3 (Three) Times a Day As Needed., Disp: , Rfl:   •  traMADol (ULTRAM) 50 MG tablet, Take  "50 mg by mouth Every 8 (Eight) Hours., Disp: , Rfl:     Allergies:   Allergies   Allergen Reactions   • Ibuprofen Hives         Objective     Physical Exam:   Vital Signs:   Vitals:    01/20/22 1003   Weight: 122 kg (268 lb)   Height: 182.9 cm (72\")   PainSc:   3     Body mass index is 36.35 kg/m².     Physical Exam  Vitals and nursing note reviewed.   Constitutional:       Appearance: Normal appearance. He is normal weight.   Cardiovascular:      Comments: Well-perfused  Pulmonary:      Effort: Pulmonary effort is normal.   Abdominal:      General: Abdomen is flat.      Palpations: Abdomen is soft.   Musculoskeletal:         General: Normal range of motion.   Skin:     General: Skin is warm and dry.   Neurological:      General: No focal deficit present.      Mental Status: He is alert and oriented to person, place, and time. Mental status is at baseline.   Psychiatric:         Mood and Affect: Mood normal.         Behavior: Behavior normal.         Thought Content: Thought content normal.         Judgment: Judgment normal.         Labs:   Brief Urine Lab Results  (Last result in the past 365 days)      Color   Clarity   Blood   Leuk Est   Nitrite   Protein   CREAT   Urine HCG        01/20/22 1005 Yellow   Clear   Negative   Negative   Negative   Negative                      Lab Results   Component Value Date    GLUCOSE 136 (H) 12/19/2021    CALCIUM 9.2 12/19/2021     12/19/2021    K 4.2 12/19/2021    CO2 22.0 12/19/2021     12/19/2021    BUN 18 12/19/2021    CREATININE 1.12 12/19/2021    EGFRIFNONA 66 12/19/2021    BCR 16.1 12/19/2021    ANIONGAP 14.0 12/19/2021       Lab Results   Component Value Date    WBC 12.93 (H) 12/19/2021    HGB 13.6 12/19/2021    HCT 42.2 12/19/2021    MCV 88.3 12/19/2021     12/19/2021       Images:   CT Abdomen Pelvis Without Contrast    Result Date: 12/18/2021  4 mm distal left ureteral stone with mild left hydronephrosis otherwise normal Signer Name: Gianni Bhatia MD  " Signed: 12/18/2021 5:38 AM  Workstation Name: [id number]  [facility] Renal Bilateral    Result Date: 12/30/2021  Unremarkable bilateral renal ultrasound.  D:  12/29/2021 E:  12/29/2021    This report was finalized on 12/30/2021 4:26 PM by Dr. Ruba Jimenez MD.      I have personally reviewed patient's postoperative renal ultrasound.  No evidence of hydroureteronephrosis.    Measures:   Tobacco:   Joseph Garcia  reports that he has quit smoking. He has never used smokeless tobacco.. I have educated him on the risk of diseases from using tobacco products.    Assessment / Plan      Assessment/Plan:   61 y.o. male is seen today for 4-week postoperative follow-up after ureteroscopy and laser lithotripsy.  Renal ultrasound demonstrating no evidence of hydroureteronephrosis.  No recurrence of symptoms.  Ureteral stent removed without difficulty.  Today we discussed allergies to reduce stone risk in the future including increasing fluid intake to greater than 2.5 L/day, increasing urine output to greater than 2 L a day, decreasing sodium and animal protein intake within the diet, increasing citrate intake.  Additionally we also discussed the patient's baseline lower urinary tract symptoms.  We discussed conservative strategies to improve symptoms.  We discussed p.o. medical options for management, at this time the patient would like to manage conservatively.  He will follow-up in 6 months for a symptom check regarding urinary symptoms.  He is understanding agreeable plan of care.    Diagnoses and all orders for this visit:    1. Ureteral stone (Primary)    2. Left ureteral stone  -     POC Urinalysis Dipstick, Automated         Follow Up:   Return in about 6 months (around 7/20/2022) for Recheck.     I spent 30 minutes providing clinical care for this patient; including review of patient's chart and provider documentation, face to face time spent with patient in examination room  (obtaining history, performing physical exam, discussing diagnosis and management options), placing orders, and completing patient documentation.     Rick Hargrove MD  [facility] [place]

## 2023-01-09 ENCOUNTER — APPOINTMENT (OUTPATIENT)
Dept: CT IMAGING | Facility: HOSPITAL | Age: 63
End: 2023-01-09
Payer: COMMERCIAL

## 2023-01-09 ENCOUNTER — APPOINTMENT (OUTPATIENT)
Dept: GENERAL RADIOLOGY | Facility: HOSPITAL | Age: 63
End: 2023-01-09
Payer: COMMERCIAL

## 2023-01-09 ENCOUNTER — APPOINTMENT (OUTPATIENT)
Dept: MRI IMAGING | Facility: HOSPITAL | Age: 63
End: 2023-01-09
Payer: COMMERCIAL

## 2023-01-09 ENCOUNTER — HOSPITAL ENCOUNTER (OUTPATIENT)
Facility: HOSPITAL | Age: 63
Setting detail: OBSERVATION
Discharge: HOME | End: 2023-01-12
Attending: EMERGENCY MEDICINE | Admitting: STUDENT IN AN ORGANIZED HEALTH CARE EDUCATION/TRAINING PROGRAM
Payer: COMMERCIAL

## 2023-01-09 DIAGNOSIS — R11.0 NAUSEA: ICD-10-CM

## 2023-01-09 DIAGNOSIS — R42 VERTIGO: Primary | ICD-10-CM

## 2023-01-09 DIAGNOSIS — R42 LIGHTHEADEDNESS: ICD-10-CM

## 2023-01-09 LAB
ALBUMIN SERPL-MCNC: 4.4 G/DL (ref 3.5–5.2)
ALBUMIN/GLOB SERPL: 1.6 G/DL
ALP SERPL-CCNC: 67 U/L (ref 39–117)
ALT SERPL W P-5'-P-CCNC: 10 U/L (ref 1–41)
ALT SERPL W P-5'-P-CCNC: 10 U/L (ref 1–41)
ANION GAP SERPL CALCULATED.3IONS-SCNC: 9 MMOL/L (ref 5–15)
APTT PPP: 32.6 SECONDS (ref 22–39)
AST SERPL-CCNC: 20 U/L (ref 1–40)
AST SERPL-CCNC: 23 U/L (ref 1–40)
BASOPHILS # BLD AUTO: 0.03 10*3/MM3 (ref 0–0.2)
BASOPHILS NFR BLD AUTO: 0.4 % (ref 0–1.5)
BILIRUB SERPL-MCNC: 0.4 MG/DL (ref 0–1.2)
BUN SERPL-MCNC: 12 MG/DL (ref 8–23)
BUN/CREAT SERPL: 16 (ref 7–25)
CALCIUM SPEC-SCNC: 9.7 MG/DL (ref 8.6–10.5)
CHLORIDE SERPL-SCNC: 102 MMOL/L (ref 98–107)
CHOLEST SERPL-MCNC: 238 MG/DL (ref 0–200)
CO2 SERPL-SCNC: 28 MMOL/L (ref 22–29)
CREAT SERPL-MCNC: 0.75 MG/DL (ref 0.76–1.27)
DEPRECATED RDW RBC AUTO: 40.5 FL (ref 37–54)
EGFRCR SERPLBLD CKD-EPI 2021: 100.8 ML/MIN/1.73
EOSINOPHIL # BLD AUTO: 0.01 10*3/MM3 (ref 0–0.4)
EOSINOPHIL NFR BLD AUTO: 0.1 % (ref 0.3–6.2)
ERYTHROCYTE [DISTWIDTH] IN BLOOD BY AUTOMATED COUNT: 12.8 % (ref 12.3–15.4)
GLOBULIN UR ELPH-MCNC: 2.7 GM/DL
GLUCOSE BLDC GLUCOMTR-MCNC: 120 MG/DL (ref 70–130)
GLUCOSE SERPL-MCNC: 107 MG/DL (ref 65–99)
HBA1C MFR BLD: 5.5 % (ref 4.8–5.6)
HCT VFR BLD AUTO: 39.8 % (ref 37.5–51)
HDLC SERPL-MCNC: 64 MG/DL (ref 40–60)
HGB BLD-MCNC: 13.5 G/DL (ref 13–17.7)
HOLD SPECIMEN: NORMAL
IMM GRANULOCYTES # BLD AUTO: 0.07 10*3/MM3 (ref 0–0.05)
IMM GRANULOCYTES NFR BLD AUTO: 0.9 % (ref 0–0.5)
LDLC SERPL CALC-MCNC: 163 MG/DL (ref 0–100)
LDLC/HDLC SERPL: 2.51 {RATIO}
LYMPHOCYTES # BLD AUTO: 0.89 10*3/MM3 (ref 0.7–3.1)
LYMPHOCYTES NFR BLD AUTO: 11.2 % (ref 19.6–45.3)
MAGNESIUM SERPL-MCNC: 2.1 MG/DL (ref 1.6–2.4)
MCH RBC QN AUTO: 29.7 PG (ref 26.6–33)
MCHC RBC AUTO-ENTMCNC: 33.9 G/DL (ref 31.5–35.7)
MCV RBC AUTO: 87.5 FL (ref 79–97)
MONOCYTES # BLD AUTO: 0.23 10*3/MM3 (ref 0.1–0.9)
MONOCYTES NFR BLD AUTO: 2.9 % (ref 5–12)
NEUTROPHILS NFR BLD AUTO: 6.7 10*3/MM3 (ref 1.7–7)
NEUTROPHILS NFR BLD AUTO: 84.5 % (ref 42.7–76)
NRBC BLD AUTO-RTO: 0 /100 WBC (ref 0–0.2)
PLATELET # BLD AUTO: 189 10*3/MM3 (ref 140–450)
PMV BLD AUTO: 11.4 FL (ref 6–12)
POTASSIUM SERPL-SCNC: 4.5 MMOL/L (ref 3.5–5.2)
PROT SERPL-MCNC: 7.1 G/DL (ref 6–8.5)
RBC # BLD AUTO: 4.55 10*6/MM3 (ref 4.14–5.8)
SODIUM SERPL-SCNC: 139 MMOL/L (ref 136–145)
TRIGL SERPL-MCNC: 68 MG/DL (ref 0–150)
TROPONIN T SERPL-MCNC: <0.01 NG/ML (ref 0–0.03)
TROPONIN T SERPL-MCNC: <0.01 NG/ML (ref 0–0.03)
VLDLC SERPL-MCNC: 11 MG/DL (ref 5–40)
WBC NRBC COR # BLD: 7.93 10*3/MM3 (ref 3.4–10.8)
WHOLE BLOOD HOLD COAG: NORMAL
WHOLE BLOOD HOLD COAG: NORMAL
WHOLE BLOOD HOLD SPECIMEN: NORMAL
WHOLE BLOOD HOLD SPECIMEN: NORMAL

## 2023-01-09 PROCEDURE — 96374 THER/PROPH/DIAG INJ IV PUSH: CPT

## 2023-01-09 PROCEDURE — 82962 GLUCOSE BLOOD TEST: CPT

## 2023-01-09 PROCEDURE — G0378 HOSPITAL OBSERVATION PER HR: HCPCS

## 2023-01-09 PROCEDURE — 36415 COLL VENOUS BLD VENIPUNCTURE: CPT

## 2023-01-09 PROCEDURE — 99223 1ST HOSP IP/OBS HIGH 75: CPT | Performed by: INTERNAL MEDICINE

## 2023-01-09 PROCEDURE — 85025 COMPLETE CBC W/AUTO DIFF WBC: CPT | Performed by: EMERGENCY MEDICINE

## 2023-01-09 PROCEDURE — 70551 MRI BRAIN STEM W/O DYE: CPT

## 2023-01-09 PROCEDURE — 85730 THROMBOPLASTIN TIME PARTIAL: CPT | Performed by: EMERGENCY MEDICINE

## 2023-01-09 PROCEDURE — 99204 OFFICE O/P NEW MOD 45 MIN: CPT | Performed by: NURSE PRACTITIONER

## 2023-01-09 PROCEDURE — 25010000002 ONDANSETRON PER 1 MG: Performed by: INTERNAL MEDICINE

## 2023-01-09 PROCEDURE — 83036 HEMOGLOBIN GLYCOSYLATED A1C: CPT | Performed by: INTERNAL MEDICINE

## 2023-01-09 PROCEDURE — 80061 LIPID PANEL: CPT | Performed by: INTERNAL MEDICINE

## 2023-01-09 PROCEDURE — 0 IOPAMIDOL PER 1 ML: Performed by: EMERGENCY MEDICINE

## 2023-01-09 PROCEDURE — 96375 TX/PRO/DX INJ NEW DRUG ADDON: CPT

## 2023-01-09 PROCEDURE — 0042T HC CT CEREBRAL PERFUSION W/WO CONTRAST: CPT

## 2023-01-09 PROCEDURE — 25010000002 ONDANSETRON PER 1 MG: Performed by: EMERGENCY MEDICINE

## 2023-01-09 PROCEDURE — 93005 ELECTROCARDIOGRAM TRACING: CPT | Performed by: EMERGENCY MEDICINE

## 2023-01-09 PROCEDURE — 96376 TX/PRO/DX INJ SAME DRUG ADON: CPT

## 2023-01-09 PROCEDURE — 25010000002 DIAZEPAM PER 5 MG: Performed by: EMERGENCY MEDICINE

## 2023-01-09 PROCEDURE — 71045 X-RAY EXAM CHEST 1 VIEW: CPT

## 2023-01-09 PROCEDURE — 80053 COMPREHEN METABOLIC PANEL: CPT | Performed by: EMERGENCY MEDICINE

## 2023-01-09 PROCEDURE — 83735 ASSAY OF MAGNESIUM: CPT | Performed by: EMERGENCY MEDICINE

## 2023-01-09 PROCEDURE — 99285 EMERGENCY DEPT VISIT HI MDM: CPT

## 2023-01-09 PROCEDURE — 84484 ASSAY OF TROPONIN QUANT: CPT | Performed by: EMERGENCY MEDICINE

## 2023-01-09 PROCEDURE — 70450 CT HEAD/BRAIN W/O DYE: CPT

## 2023-01-09 PROCEDURE — 70498 CT ANGIOGRAPHY NECK: CPT

## 2023-01-09 PROCEDURE — 70496 CT ANGIOGRAPHY HEAD: CPT

## 2023-01-09 RX ORDER — POLYETHYLENE GLYCOL 3350 17 G/17G
17 POWDER, FOR SOLUTION ORAL DAILY PRN
Status: DISCONTINUED | OUTPATIENT
Start: 2023-01-09 | End: 2023-01-12 | Stop reason: HOSPADM

## 2023-01-09 RX ORDER — ONDANSETRON 2 MG/ML
4 INJECTION INTRAMUSCULAR; INTRAVENOUS EVERY 6 HOURS PRN
Status: DISCONTINUED | OUTPATIENT
Start: 2023-01-09 | End: 2023-01-12 | Stop reason: HOSPADM

## 2023-01-09 RX ORDER — ASPIRIN 81 MG/1
81 TABLET ORAL DAILY
Status: DISCONTINUED | OUTPATIENT
Start: 2023-01-10 | End: 2023-01-09 | Stop reason: SDUPTHER

## 2023-01-09 RX ORDER — FLUTICASONE PROPIONATE 50 MCG
2 SPRAY, SUSPENSION (ML) NASAL DAILY
Status: DISCONTINUED | OUTPATIENT
Start: 2023-01-10 | End: 2023-01-12 | Stop reason: HOSPADM

## 2023-01-09 RX ORDER — SODIUM CHLORIDE 0.9 % (FLUSH) 0.9 %
10 SYRINGE (ML) INJECTION AS NEEDED
Status: DISCONTINUED | OUTPATIENT
Start: 2023-01-09 | End: 2023-01-12 | Stop reason: HOSPADM

## 2023-01-09 RX ORDER — TRAMADOL HYDROCHLORIDE 50 MG/1
50 TABLET ORAL EVERY 8 HOURS PRN
Status: DISCONTINUED | OUTPATIENT
Start: 2023-01-09 | End: 2023-01-12 | Stop reason: HOSPADM

## 2023-01-09 RX ORDER — AMOXICILLIN 250 MG
2 CAPSULE ORAL 2 TIMES DAILY
Status: DISCONTINUED | OUTPATIENT
Start: 2023-01-09 | End: 2023-01-12 | Stop reason: HOSPADM

## 2023-01-09 RX ORDER — ATORVASTATIN CALCIUM 40 MG/1
80 TABLET, FILM COATED ORAL NIGHTLY
Status: DISCONTINUED | OUTPATIENT
Start: 2023-01-09 | End: 2023-01-12 | Stop reason: HOSPADM

## 2023-01-09 RX ORDER — ONDANSETRON 2 MG/ML
4 INJECTION INTRAMUSCULAR; INTRAVENOUS ONCE
Status: COMPLETED | OUTPATIENT
Start: 2023-01-09 | End: 2023-01-09

## 2023-01-09 RX ORDER — BISACODYL 10 MG
10 SUPPOSITORY, RECTAL RECTAL DAILY PRN
Status: DISCONTINUED | OUTPATIENT
Start: 2023-01-09 | End: 2023-01-12 | Stop reason: HOSPADM

## 2023-01-09 RX ORDER — MECLIZINE HYDROCHLORIDE 25 MG/1
12.5 TABLET ORAL 2 TIMES DAILY PRN
Status: DISCONTINUED | OUTPATIENT
Start: 2023-01-09 | End: 2023-01-12 | Stop reason: HOSPADM

## 2023-01-09 RX ORDER — SODIUM CHLORIDE 0.9 % (FLUSH) 0.9 %
10 SYRINGE (ML) INJECTION AS NEEDED
Status: DISCONTINUED | OUTPATIENT
Start: 2023-01-09 | End: 2023-01-10

## 2023-01-09 RX ORDER — SODIUM CHLORIDE 9 MG/ML
40 INJECTION, SOLUTION INTRAVENOUS AS NEEDED
Status: DISCONTINUED | OUTPATIENT
Start: 2023-01-09 | End: 2023-01-10

## 2023-01-09 RX ORDER — ATORVASTATIN CALCIUM 40 MG/1
40 TABLET, FILM COATED ORAL DAILY
Status: DISCONTINUED | OUTPATIENT
Start: 2023-01-09 | End: 2023-01-09 | Stop reason: SDUPTHER

## 2023-01-09 RX ORDER — HYDROCODONE BITARTRATE AND ACETAMINOPHEN 7.5; 325 MG/1; MG/1
1 TABLET ORAL EVERY 4 HOURS PRN
Status: DISCONTINUED | OUTPATIENT
Start: 2023-01-09 | End: 2023-01-12 | Stop reason: HOSPADM

## 2023-01-09 RX ORDER — DIAZEPAM 5 MG/ML
5 INJECTION, SOLUTION INTRAMUSCULAR; INTRAVENOUS ONCE
Status: COMPLETED | OUTPATIENT
Start: 2023-01-09 | End: 2023-01-09

## 2023-01-09 RX ORDER — ASPIRIN 300 MG/1
300 SUPPOSITORY RECTAL DAILY
Status: DISCONTINUED | OUTPATIENT
Start: 2023-01-09 | End: 2023-01-12 | Stop reason: HOSPADM

## 2023-01-09 RX ORDER — SODIUM CHLORIDE 0.9 % (FLUSH) 0.9 %
10 SYRINGE (ML) INJECTION EVERY 12 HOURS SCHEDULED
Status: DISCONTINUED | OUTPATIENT
Start: 2023-01-09 | End: 2023-01-12 | Stop reason: HOSPADM

## 2023-01-09 RX ORDER — ASPIRIN 325 MG
325 TABLET ORAL DAILY
Status: DISCONTINUED | OUTPATIENT
Start: 2023-01-09 | End: 2023-01-12 | Stop reason: HOSPADM

## 2023-01-09 RX ORDER — SODIUM CHLORIDE 0.9 % (FLUSH) 0.9 %
10 SYRINGE (ML) INJECTION EVERY 12 HOURS SCHEDULED
Status: DISCONTINUED | OUTPATIENT
Start: 2023-01-09 | End: 2023-01-10

## 2023-01-09 RX ORDER — BISACODYL 5 MG/1
5 TABLET, DELAYED RELEASE ORAL DAILY PRN
Status: DISCONTINUED | OUTPATIENT
Start: 2023-01-09 | End: 2023-01-12 | Stop reason: HOSPADM

## 2023-01-09 RX ORDER — ACETAMINOPHEN 325 MG/1
650 TABLET ORAL EVERY 4 HOURS PRN
Status: DISCONTINUED | OUTPATIENT
Start: 2023-01-09 | End: 2023-01-12 | Stop reason: HOSPADM

## 2023-01-09 RX ORDER — TAMSULOSIN HYDROCHLORIDE 0.4 MG/1
0.4 CAPSULE ORAL DAILY
Status: DISCONTINUED | OUTPATIENT
Start: 2023-01-10 | End: 2023-01-12 | Stop reason: HOSPADM

## 2023-01-09 RX ORDER — SODIUM CHLORIDE 0.9 % (FLUSH) 0.9 %
10 SYRINGE (ML) INJECTION AS NEEDED
Status: DISCONTINUED | OUTPATIENT
Start: 2023-01-09 | End: 2023-01-09

## 2023-01-09 RX ORDER — SODIUM CHLORIDE 9 MG/ML
40 INJECTION, SOLUTION INTRAVENOUS AS NEEDED
Status: DISCONTINUED | OUTPATIENT
Start: 2023-01-09 | End: 2023-01-12 | Stop reason: HOSPADM

## 2023-01-09 RX ADMIN — Medication 10 ML: at 21:42

## 2023-01-09 RX ADMIN — ONDANSETRON 4 MG: 2 INJECTION INTRAMUSCULAR; INTRAVENOUS at 23:43

## 2023-01-09 RX ADMIN — DIAZEPAM 5 MG: 5 INJECTION, SOLUTION INTRAMUSCULAR; INTRAVENOUS at 17:30

## 2023-01-09 RX ADMIN — ATORVASTATIN CALCIUM 80 MG: 40 TABLET, FILM COATED ORAL at 22:12

## 2023-01-09 RX ADMIN — IOPAMIDOL 115 ML: 755 INJECTION, SOLUTION INTRAVENOUS at 17:51

## 2023-01-09 RX ADMIN — ONDANSETRON 4 MG: 2 INJECTION INTRAMUSCULAR; INTRAVENOUS at 17:30

## 2023-01-09 RX ADMIN — ASPIRIN 325 MG ORAL TABLET 325 MG: 325 PILL ORAL at 22:12

## 2023-01-09 NOTE — ED PROVIDER NOTES
Subjective   History of Present Illness  Patient is a pleasant 62-year-old male with a history of hyperlipidemia but otherwise unremarkable history who presents today with vertigo and concern for stroke.  He states that at 9 AM he was doing his normal morning routine when he became acutely dizzy.  He said his said the room was violently spinning around him.  Associated with nausea.  This waxed and waned throughout the day.  He went to his primary care provider and was referred to the emergency department for stroke rule out.  Patient denies other unilateral deficits.  He states that he has been somewhat lightheaded also with this with the severe vertigo.  Due to patient's significant distress history is limited and he is not able to answer review of systems well.  Patient is hyperventilating and very weak and near syncopal at the time my examination.  Denies fever, chills, chest pain, abdominal pain, or diarrhea.        Review of Systems   Unable to perform ROS: Acuity of condition       Past Medical History:   Diagnosis Date   • Hyperlipidemia        Allergies   Allergen Reactions   • Ibuprofen Hives       Past Surgical History:   Procedure Laterality Date   • CYSTOSCOPY, URETEROSCOPY, RETROGRADE PYELOGRAM, STONE EXTRACTION, STENT INSERTION Left 12/18/2021    Procedure: CYSTOSCOPY URETEROSCOPY RETROGRADE PYELOGRAM STONE EXTRACTION STENT INSERTION;  Surgeon: Rick Hargrove MD;  Location: [facility];  Service: Urology;  Laterality: Left;   • KNEE SURGERY     • SHOULDER SURGERY         No family history on file.    Social History     Socioeconomic History   • Marital status:    Tobacco Use   • Smoking status: Former   • Smokeless tobacco: Never   Vaping Use   • Vaping Use: Never used   Substance and Sexual Activity   • Alcohol use: Never   • Drug use: Defer   • Sexual activity: Defer           Objective   Physical Exam  Vitals reviewed.   Constitutional:       General: He is in acute distress.      Appearance:  He is ill-appearing, toxic-appearing and diaphoretic.      Comments: Patient is in significant distress.  Peers very uncomfortable.  He is pale with his eyes closed hyperventilating with a emesis bag his chest.   HENT:      Head: Normocephalic and atraumatic.      Nose: Nose normal.      Mouth/Throat:      Mouth: Mucous membranes are moist.   Eyes:      Extraocular Movements: Extraocular movements intact.      Conjunctiva/sclera: Conjunctivae normal.      Pupils: Pupils are equal, round, and reactive to light.      Comments: Torsional nystagmus   Cardiovascular:      Rate and Rhythm: Normal rate and regular rhythm.      Pulses: Normal pulses.      Heart sounds: Normal heart sounds. No murmur heard.  Pulmonary:      Effort: Pulmonary effort is normal. No respiratory distress.      Breath sounds: Normal breath sounds. No wheezing or rhonchi.   Abdominal:      General: Bowel sounds are normal. There is no distension.      Palpations: Abdomen is soft.      Tenderness: There is no abdominal tenderness. There is no guarding or rebound.   Musculoskeletal:         General: No swelling, deformity or signs of injury. Normal range of motion.      Cervical back: Normal range of motion.   Skin:     Capillary Refill: Capillary refill takes less than 2 seconds.      Coloration: Skin is pale.      Comments: Clammy   Neurological:      Mental Status: He is oriented to person, place, and time.      Comments: Significant generalized weakness, but no focal deficit.         Procedures           ED Course  ED Course as of 01/09/23 1743 Mon Jan 09, 2023 1741 Patient presents with significant vertigo.  Central versus peripheral.  He is very distressed and near syncopal at the time my evaluation.  He says that he is unable to lift his hands or his legs.  I believe he is hyperventilating and this causes significant weakness and near syncopal symptoms.  He was not immediately made COVID-19 or code stroke at the time my examination.  He  was given a Zofran and Valium in the CT scanner.  He was placed on O2 sat monitor and his blood pressure was also taken on the CT scanner while he was symptomatic.  Vital signs are reassuring.  He is on room air satting 99%.  He does feel slightly better after the Valium.  Episode of vertigo is a significant concern for him but even if it is a peripheral vertigo I significantly doubt that we will be able to adequately control his symptoms in the emergency department.  I discussed this with the stroke team who is also caring for the patient at this time.  CT perfusion and CTAs are pending. [CP]      ED Course User Index  [CP] Trae England,       Recent Results (from the past 24 hour(s))   Comprehensive Metabolic Panel    Collection Time: 01/09/23  5:01 PM    Specimen: Blood   Result Value Ref Range    Glucose 107 (H) 65 - 99 mg/dL    BUN 12 8 - 23 mg/dL    Creatinine 0.75 (L) 0.76 - 1.27 mg/dL    Sodium 139 136 - 145 mmol/L    Potassium 4.5 3.5 - 5.2 mmol/L    Chloride 102 98 - 107 mmol/L    CO2 28.0 22.0 - 29.0 mmol/L    Calcium 9.7 8.6 - 10.5 mg/dL    Total Protein 7.1 6.0 - 8.5 g/dL    Albumin 4.4 3.5 - 5.2 g/dL    ALT (SGPT) 10 1 - 41 U/L    AST (SGOT) 20 1 - 40 U/L    Alkaline Phosphatase 67 39 - 117 U/L    Total Bilirubin 0.4 0.0 - 1.2 mg/dL    Globulin 2.7 gm/dL    A/G Ratio 1.6 g/dL    BUN/Creatinine Ratio 16.0 7.0 - 25.0    Anion Gap 9.0 5.0 - 15.0 mmol/L    eGFR 100.8 >60.0 mL/min/1.73   Troponin    Collection Time: 01/09/23  5:01 PM    Specimen: Blood   Result Value Ref Range    Troponin T <0.010 0.000 - 0.030 ng/mL   Magnesium    Collection Time: 01/09/23  5:01 PM    Specimen: Blood   Result Value Ref Range    Magnesium 2.1 1.6 - 2.4 mg/dL   CBC Auto Differential    Collection Time: 01/09/23  5:01 PM    Specimen: Blood   Result Value Ref Range    WBC 7.93 3.40 - 10.80 10*3/mm3    RBC 4.55 4.14 - 5.80 10*6/mm3    Hemoglobin 13.5 13.0 - 17.7 g/dL    Hematocrit 39.8 37.5 - 51.0 %    MCV 87.5 79.0 - 97.0 fL     MCH 29.7 26.6 - 33.0 pg    MCHC 33.9 31.5 - 35.7 g/dL    RDW 12.8 12.3 - 15.4 %    RDW-SD 40.5 37.0 - 54.0 fl    MPV 11.4 6.0 - 12.0 fL    Platelets 189 140 - 450 10*3/mm3    Neutrophil % 84.5 (H) 42.7 - 76.0 %    Lymphocyte % 11.2 (L) 19.6 - 45.3 %    Monocyte % 2.9 (L) 5.0 - 12.0 %    Eosinophil % 0.1 (L) 0.3 - 6.2 %    Basophil % 0.4 0.0 - 1.5 %    Immature Grans % 0.9 (H) 0.0 - 0.5 %    Neutrophils, Absolute 6.70 1.70 - 7.00 10*3/mm3    Lymphocytes, Absolute 0.89 0.70 - 3.10 10*3/mm3    Monocytes, Absolute 0.23 0.10 - 0.90 10*3/mm3    Eosinophils, Absolute 0.01 0.00 - 0.40 10*3/mm3    Basophils, Absolute 0.03 0.00 - 0.20 10*3/mm3    Immature Grans, Absolute 0.07 (H) 0.00 - 0.05 10*3/mm3    nRBC 0.0 0.0 - 0.2 /100 WBC     Note: In addition to lab results from this visit, the labs listed above may include labs taken at another facility or during a different encounter within the last 24 hours. Please correlate lab times with ED admission and discharge times for further clarification of the services performed during this visit.    MRI Brain Without Contrast   Final Result   Impression:    No evidence of acute infarct. No acute intracranial process demonstrated      Electronically Signed: Nick Portillo     1/9/2023 8:45 PM EST     Workstation ID: [id number]      CT Angiogram Head w AI Analysis of LVO   Final Result   Impression:   Essentially normal CT angiogram of the head and neck. There is no evidence of high-grade flow-limiting stenosis, large vessel occlusion or aneurysmal dilatation.      Electronically Signed: Ayden Cazares     1/9/2023 6:04 PM EST     Workstation ID: [id number]      CT Angiogram Neck   Final Result   Impression:   Essentially normal CT angiogram of the head and neck. There is no evidence of high-grade flow-limiting stenosis, large vessel occlusion or aneurysmal dilatation.      Electronically Signed: Ayden Cazares     1/9/2023 6:04 PM EST     Workstation ID: [id number]      CT CEREBRAL PERFUSION  "WITH & WITHOUT CONTRAST   Final Result      1.    1. No significant area of cerebral ischemia. No evidence of core infarct.          Electronically Signed: Winston Cortez     1/9/2023 6:01 PM EST     Workstation ID: [id number]      CT Head Without Contrast Stroke Protocol   Final Result   Age-related changes of the brain as above, otherwise without evidence of acute intracranial abnormality.          Electronically Signed: Ayden Cazares     1/9/2023 5:33 PM EST     Workstation ID: [id number]      XR Chest 1 View   Final Result   Impression:   No acute chest finding.      Electronically Signed: Brittanie Lincoln     1/9/2023 5:02 PM EST     Workstation ID: [id number]        Vitals:    01/09/23 1542   BP: 133/90   BP Location: Left arm   Patient Position: Sitting   Pulse: 67   Resp: 20   Temp: 98.2 °F (36.8 °C)   TempSrc: Oral   SpO2: 98%   Weight: 103 kg (226 lb)   Height: 177.8 cm (70\")     Medications   sodium chloride 0.9 % flush 10 mL (has no administration in time range)   sodium chloride 0.9 % flush 10 mL (has no administration in time range)   diazePAM (VALIUM) injection 5 mg (5 mg Intravenous Given 1/9/23 1730)   ondansetron (ZOFRAN) injection 4 mg (4 mg Intravenous Given 1/9/23 1730)     ECG/EMG Results (last 24 hours)     ** No results found for the last 24 hours. **        ECG 12 Lead ED Triage Standing Order; Weak / Dizzy / AMS   Final Result   Test Reason : STROKE   Blood Pressure :   */*   mmHG   Vent. Rate :  77 BPM     Atrial Rate :  77 BPM      P-R Int : 162 ms          QRS Dur :  88 ms       QT Int : 432 ms       P-R-T Axes :  19  26  31 degrees      QTc Int : 488 ms      Sinus rhythm with occasional premature ventricular complexes   Nonspecific T wave abnormality   Abnormal ECG   When compared with ECG of 18-DEC-2021 13:50,   premature ventricular complexes are now present   Nonspecific T wave abnormality now evident in Lateral leads   QT has lengthened   Confirmed by MD MOHINDER, EVELYN ([id number]) on 1/14/2023 " 12:55:33 PM      Referred By: ED MD           Confirmed By: EVELYN VINES MD                                               Medical Decision Making  Amount and/or Complexity of Data Reviewed  External Data Reviewed: notes.  Labs: ordered. Decision-making details documented in ED Course.  Radiology: ordered and independent interpretation performed. Decision-making details documented in ED Course.  ECG/medicine tests: ordered and independent interpretation performed. Decision-making details documented in ED Course.      Risk  Prescription drug management.  Decision regarding hospitalization.        Patient is not a candidate for thrombolytics due to onset time well over 4.5 hours ago.  Case discussed with the neurology stroke team, who evaluated the patient in the emergency department.  I discussed the case with internal medicine who will admit for further evaluation and management.      Final diagnoses:   Vertigo   Lightheadedness   Nausea       ED Disposition  ED Disposition     ED Disposition   Decision to Admit    Condition   --    Comment   Level of Care: Telemetry [5]   Diagnosis: Vertigo [282207]   Admitting Physician: GOYO VALVERDE [[id number]]   Attending Physician: GOYO VALVERDE [[id number]]   Isolate for COVID?: No [0]   Certification: I Certify That Inpatient Hospital Services Are Medically Necessary For Greater Than 2 Midnights                Evelyn Vines DO  02/05/23 0932
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
## 2023-01-09 NOTE — CONSULTS
Stroke Consult Note    Patient Name: Joseph Garcia   MRN: [id number]  Age: 62 y.o.  Sex: male  : 1960    Primary Care Physician: Brandon Wei MD  Referring Physician:  Dr. England     TIME STROKE TEAM CALLED: 1720 EST     TIME PATIENT SEEN: 172 EST    Handedness: right   Race:       Chief Complaint/Reason for Consultation: dizziness, nausea     HPI:   Joseph Garcia is a 62 year old male with unknown past medical diagnoses. He presents to [facility] ED via private vehicle with complaints of dizziness and nausea that have been waxing and waning since approximately 9 am. He appears to be uncomfortable due to his symptoms. Given zofran and valium. He was taken for advanced imaging. CT head w/o was negative for acute intracranial abnormality. CTA head and neck is negative for large vessel occlusion or flow limiting stenosis. CTP is negative for area of reversible ischemia. Patient's symptoms could be due to posterior circulation stroke versus vertigo. Due to the severe nature of his symptoms patient will need to be admitted to the hospital for full work up and symptoms management.     Last Known Normal Date/Time: 0900 EST     Review of Systems   Constitutional: Positive for fatigue. Negative for chills, diaphoresis and fever.   Eyes: Positive for visual disturbance.   Respiratory: Negative for cough and shortness of breath.    Cardiovascular: Negative for chest pain and palpitations.   Gastrointestinal: Positive for nausea.   Neurological: Positive for dizziness and weakness.   Psychiatric/Behavioral: Negative for confusion.      Past Medical History:   Diagnosis Date   • Hyperlipidemia      Past Surgical History:   Procedure Laterality Date   • CYSTOSCOPY, URETEROSCOPY, RETROGRADE PYELOGRAM, STONE EXTRACTION, STENT INSERTION Left 2021    Procedure: CYSTOSCOPY URETEROSCOPY RETROGRADE PYELOGRAM STONE EXTRACTION STENT INSERTION;  Surgeon: Rick Hargrove MD;  Location: [facility];  Service: Urology;   Laterality: Left;   • KNEE SURGERY     • SHOULDER SURGERY       No family history on file.  Social History     Socioeconomic History   • Marital status:    Tobacco Use   • Smoking status: Former   • Smokeless tobacco: Never   Vaping Use   • Vaping Use: Never used   Substance and Sexual Activity   • Alcohol use: Never   • Drug use: Defer   • Sexual activity: Defer     Allergies   Allergen Reactions   • Ibuprofen Hives     Prior to Admission medications    Medication Sig Start Date End Date Taking? Authorizing Provider   Aspirin 81 MG capsule Take 81 mg by mouth Daily.    Provider, MD Jennifer   atorvastatin (LIPITOR) 40 MG tablet Take 40 mg by mouth Daily. 12/9/19   Emergency, Nurse YOMI Rogers   CAMBIA 50 MG pack TAKE 1 PACKET BY MOUTH AT ONSET OF HEADACHE ONCE DAILY 12/12/19   Emergency, Nurse YOMI Rogers   cetirizine (zyrTEC) 10 MG tablet Take 10 mg by mouth every night at bedtime. 11/25/19   Emergency, Nurse YOMI Rogers   Diclofenac Sodium (Pennsaid) 2 % solution Pennsaid 20 mg/gram/actuation (2 %) topical soln in metered-dose pump    Emergency, Nurse Epic, RN   diphenhydrAMINE-APAP, sleep, (TYLENOL PM EXTRA STRENGTH PO) Take  by mouth.    Emergency, Nurse Sue RN   FLECTOR 1.3 % patch patch APPLY 1 PATCH TO AFFECTED AREA(S) TWICE DAILY 12/12/19   Emergency, Nurse Epic, RN   fluticasone (FLONASE) 50 MCG/ACT nasal spray 2 sprays Daily. 9/26/19   Emergency, Nurse Epic, RN   neomycin-polymyxin-hydrocortisone (CORTISPORIN) 3.5-44366-8 otic solution INSTILL 3 DROPS INTO AFFECTED EAR(S) 3 TO 4 TIMES DAILY 11/21/19   Emergency, Nurse YOMI Rogers   nitrofurantoin, macrocrystal-monohydrate, (Macrobid) 100 MG capsule Take 1 capsule by mouth Every Night. 12/19/21   Benitez Casas,    ondansetron ODT (Zofran ODT) 4 MG disintegrating tablet Place 1 tablet on the tongue Every 8 (Eight) Hours As Needed for Nausea or Vomiting. 12/19/21   Benitez Casas,    PENNSAID 2 % solution APPLY 2 PUMPS TO AFFECTED AREA(S)  TWICE DAILY 11/11/19   Emergency, Nurse Epic, RN   phenazopyridine (Pyridium) 200 MG tablet Take 1 tablet by mouth 3 (Three) Times a Day As Needed for Bladder Spasms. 12/19/21   Benitez Casas DO   tadalafil (CIALIS) 20 MG tablet TK 1 T PO DAILY 1 HOUR BEFORE NEEDED 12/5/19   Emergency, Nurse Sue RN   tamsulosin (FLOMAX) 0.4 MG capsule 24 hr capsule Take 1 capsule by mouth Daily. 12/20/21   Benitez Casas DO   tiZANidine (ZANAFLEX) 4 MG tablet Take 4 mg by mouth 3 (Three) Times a Day As Needed. 12/12/19   Emergency, Nurse Sue RN   traMADol (ULTRAM) 50 MG tablet Take 50 mg by mouth Every 8 (Eight) Hours. 10/17/19   Emergency, Nurse Epic, RN         Temp:  [98.2 °F (36.8 °C)] 98.2 °F (36.8 °C)  Heart Rate:  [67] 67  Resp:  [20] 20  BP: (133)/(90) 133/90  Neurological Exam  Mental Status  Drowsy. Level of consciousness: Prefers to keep eyes closed due to dizziness . Oriented to person, place and time. Oriented to person, place, and time. Speech is normal. Language is fluent with no aphasia.    Cranial Nerves  CN II: Hard to assess due to patient's inability to keep eyes open .  CN III, IV, VI: Extraocular movements intact bilaterally. Pupils equal round and reactive to light bilaterally.  CN V: Facial sensation is normal.  CN VII: Full and symmetric facial movement.  CN VIII: Hearing is normal to speech .    Motor  Normal muscle bulk throughout. No fasciculations present. Normal muscle tone. Strength is 5/5 in all four extremities except as noted.  Patient will not hold any extremity up against .    Sensory  Light touch is normal in upper and lower extremities.     Coordination    Finger-to-nose, rapid alternating movements and heel-to-shin normal bilaterally without dysmetria.  No obvious dysmetria out of proportion of weakness .    Gait    Not assessed .      Physical Exam  Vitals and nursing note reviewed.   Constitutional:       General: He is not in acute distress.     Appearance: He is  ill-appearing.      Comments:  male laying in bed with eyes closed    HENT:      Head: Normocephalic and atraumatic.      Nose: Nose normal.      Mouth/Throat:      Mouth: Mucous membranes are dry.   Eyes:      Extraocular Movements: Extraocular movements intact.      Pupils: Pupils are equal, round, and reactive to light.   Cardiovascular:      Rate and Rhythm: Normal rate and regular rhythm.      Pulses: Normal pulses.   Pulmonary:      Effort: Pulmonary effort is normal. No respiratory distress.   Skin:     General: Skin is warm and dry.   Neurological:      Mental Status: He is oriented to person, place, and time.      Coordination: Coordination is intact.      Comments: Horizontal nystagmus    Psychiatric:         Speech: Speech normal.         Acute Stroke Data    Thrombolytic Inclusion / Exclusion Criteria    Time: 17:33 EST  Person Administering Scale: YOUSUF Christensen    Inclusion Criteria  [x]   18 years of age or greater   []   Onset of symptoms < 4.5 hours before beginning treatment (stroke onset = time patient was last seen well or without symptoms).   []   Diagnosis of acute ischemic stroke causing measurable disabling deficit (Complete Hemianopia, Any Aphasia, Visual or Sensory Extinction, Any weakness limiting sustained effort against gravity)   []   Any remaining deficit considered potentially disabling in view of patient and practitioner   Exclusion criteria (Do not proceed with Alteplase if any are checked under exclusion criteria)  [x]   Onset unknown or GREATER than 4.5 hours   []   ICH on CT/MRI   []   CT demonstrates hypodensity representing acute or subacute infarct   []   Significant head trauma or prior stroke in the previous 3 months   []   Symptoms suggestive of subarachnoid hemorrhage   []   History of un-ruptured intracranial aneurysm GREATER than 10 mm   []   Recent intracranial or intraspinal surgery within the last 3 months   []   Arterial puncture at a  non-compressible site in the previous 7 days   []   Active internal bleeding   []   Acute bleeding tendency   []   Platelet count LESS than 100,000 for known hematological diseases such as leukemia, thrombocytopenia or chronic cirrhosis   []   Current use of anticoagulant with INR GREATER than 1.7 or PT GREATER than 15 seconds, aPTT GREATER than 40 seconds   []   Heparin received within 48 hours, resulting in abnormally elevated aPTT GREATER than upper limit of normal   []   Current use of direct thrombin inhibitors or direct factor Xa inhibitors in the past 48 hours   []   Elevated blood pressure refractory to treatment (systolic GREATER than 185 mm/Hg or diastolic  GREATER than 110 mm/Hg   []   Suspected infective endocarditis and aortic arch dissection   []   Current use of therapeutic treatment dose of low-molecular-weight heparin (LMWH) within the previous 24 hours   []   Structural GI malignancy or bleed   Relative exclusion for all patients  []   Only minor non-disabling symptoms   []   Pregnancy   []   Seizure at onset with postictal residual neurological impairments   []   Major surgery or previous trauma within past 14 days   []   History of previous spontaneous ICH, intracranial neoplasm, or AV malformation   []   Postpartum (within previous 14 days)   []   Recent GI or urinary tract hemorrhage (within previous 21 days)   []   Recent acute MI (within previous 3 months)   []   History of un-ruptured intracranial aneurysm LESS than 10 mm   []   History of ruptured intracranial aneurysm   []   Blood glucose LESS than 50 mg/dL (2.7 mmol/L)   []   Dural puncture within the last 7 days   []   Known GREATER than 10 cerebral microbleeds   Additional exclusions for patients with symptoms onset between 3 and 4.5 hours.  []   Age > 80.   []   On any anticoagulants regardless of INR  >>> Warfarin (Coumadin), Heparin, Enoxaparin (Lovenox), fondaparinux (Arixtra), bivalirudin (Angiomax), Argatroban, dabigatran  (Pradaxa), rivaroxaban (Xarelto), or apixaban (Eliquis)   []   Severe stroke (NIHSS > 25).   []   History of BOTH diabetes and previous ischemic stroke.   []   The risks and benefits have been discussed with the patient or family related to the administration of IV thrombolytic therapy for stroke symptoms.   []   I have discussed and reviewed the patient's case and imaging with the attending prior to IV thrombolytic therapy.   N/A Time IV thrombolytic administered       Hospital Meds:  Scheduled-    Infusions-     PRNs- •  sodium chloride  •  sodium chloride    Functional Status Prior to Current Stroke/Raquel Score: 0    NIH Stroke Scale  Time: 17:33 EST  Person Administering Scale: YOUSUF Christensen    Interval: baseline  1a. Level of Consciousness: 0-->Alert, keenly responsive  1b. LOC Questions: 0-->Answers both questions correctly  1c. LOC Commands: 0-->Performs both tasks correctly  2. Best Gaze: 0-->Normal  3. Visual: 0-->No visual loss  4. Facial Palsy: 0-->Normal symmetrical movements  5a. Motor Arm, Left: 2-->Some effort against gravity, limb cannot get to or maintain (if cued) 90 (or 45) degrees, drifts down to bed, but has some effort against gravity  5b. Motor Arm, Right: 2-->Some effort against gravity, limb cannot get to or maintain (if cued) 90 (or 45) degrees, drifts down to bed, but has some effort against gravity  6a. Motor Leg, Left: 2-->Some effort against gravity, leg falls to bed by 5 secs, but has some effort against gravity  6b. Motor Leg, Right: 2-->Some effort against gravity, leg falls to bed by 5 secs, but has some effort against gravity  7. Limb Ataxia: 0-->Absent  8. Sensory: 0-->Normal, no sensory loss  9. Best Language: 0-->No aphasia, normal  10. Dysarthria: 0-->Normal  11. Extinction and Inattention (formerly Neglect): 0-->No abnormality    Total (NIH Stroke Scale): 8      Results Reviewed:  I have personally reviewed current lab, radiology, and data and agree with results.      CT Angiogram Neck    Result Date: 1/9/2023  Impression: Essentially normal CT angiogram of the head and neck. There is no evidence of high-grade flow-limiting stenosis, large vessel occlusion or aneurysmal dilatation. Electronically Signed: Ayden Cazares  1/9/2023 6:04 PM EST  Workstation ID: [id number]    XR Chest 1 View    Result Date: 1/9/2023  Impression: No acute chest finding. Electronically Signed: Brittanie Lincoln  1/9/2023 5:02 PM EST  Workstation ID: [id number]    CT Head Without Contrast Stroke Protocol    Result Date: 1/9/2023  Age-related changes of the brain as above, otherwise without evidence of acute intracranial abnormality.  Electronically Signed: Ayden Cazares  1/9/2023 5:33 PM EST  Workstation ID: [id number]    CT Angiogram Head w AI Analysis of LVO    Result Date: 1/9/2023  Impression: Essentially normal CT angiogram of the head and neck. There is no evidence of high-grade flow-limiting stenosis, large vessel occlusion or aneurysmal dilatation. Electronically Signed: Ayden Cazares  1/9/2023 6:04 PM EST  Workstation ID: [id number]    CT CEREBRAL PERFUSION WITH & WITHOUT CONTRAST    Result Date: 1/9/2023  1. 1. No significant area of cerebral ischemia. No evidence of core infarct.  Electronically Signed: Winston Cortez  1/9/2023 6:01 PM EST  Workstation ID: [id number]        Assessment/Plan:    This is a 62 year old male with unknown past medical diagnoses. He presents to [facility] ED via private vehicle with complaints of dizziness and nausea that have been waxing and waning since approximately 9 am.    Antiplatelet PTA: ASA 81 mg   Anticoagulant PTA: none         Dizziness, nausea. Posterior circulation CVA vs vertigo.   -CT/CTA/CTP reviewed as above  -MRI brain   -valium 5mg   -zofran prn   -NS bolus  -TTE, lipid panel, HgbA1C  - mg and lipitor 80 mg   -PT/OT/SLP  -allow permissive hypertension until MRI reviewed       Discussed plan of care with patient, RN and ED provider. Stroke neurology will continue  to follow. Thank you this consult. Please call with questions or concerns.     Ariela West, APRN  January 9, 2023  17:33 EST

## 2023-01-10 ENCOUNTER — APPOINTMENT (OUTPATIENT)
Dept: CARDIOLOGY | Facility: HOSPITAL | Age: 63
End: 2023-01-10
Payer: COMMERCIAL

## 2023-01-10 LAB
ALBUMIN SERPL-MCNC: 4.2 G/DL (ref 3.5–5.2)
ANION GAP SERPL CALCULATED.3IONS-SCNC: 10 MMOL/L (ref 5–15)
BASOPHILS # BLD AUTO: 0.02 10*3/MM3 (ref 0–0.2)
BASOPHILS NFR BLD AUTO: 0.3 % (ref 0–1.5)
BH CV ECHO MEAS - AO MAX PG: 13.2 MMHG
BH CV ECHO MEAS - AO MEAN PG: 7 MMHG
BH CV ECHO MEAS - AO ROOT DIAM: 3.1 CM
BH CV ECHO MEAS - AO V2 MAX: 182 CM/SEC
BH CV ECHO MEAS - AO V2 VTI: 34.6 CM
BH CV ECHO MEAS - AVA(I,D): 2.34 CM2
BH CV ECHO MEAS - EDV(CUBED): 91.1 ML
BH CV ECHO MEAS - EDV(MOD-SP2): 122 ML
BH CV ECHO MEAS - EDV(MOD-SP4): 141 ML
BH CV ECHO MEAS - EF(MOD-BP): 58.7 %
BH CV ECHO MEAS - EF(MOD-SP2): 65.7 %
BH CV ECHO MEAS - EF(MOD-SP4): 50.4 %
BH CV ECHO MEAS - ESV(CUBED): 32.8 ML
BH CV ECHO MEAS - ESV(MOD-SP2): 41.8 ML
BH CV ECHO MEAS - ESV(MOD-SP4): 70 ML
BH CV ECHO MEAS - FS: 28.9 %
BH CV ECHO MEAS - IVS/LVPW: 1 CM
BH CV ECHO MEAS - IVSD: 1 CM
BH CV ECHO MEAS - LA DIMENSION: 4.1 CM
BH CV ECHO MEAS - LAT PEAK E' VEL: 11.7 CM/SEC
BH CV ECHO MEAS - LV MASS(C)D: 175 GRAMS
BH CV ECHO MEAS - LV MAX PG: 5.4 MMHG
BH CV ECHO MEAS - LV MEAN PG: 3 MMHG
BH CV ECHO MEAS - LV V1 MAX: 116 CM/SEC
BH CV ECHO MEAS - LV V1 VTI: 25.8 CM
BH CV ECHO MEAS - LVIDD: 4.5 CM
BH CV ECHO MEAS - LVIDS: 3.2 CM
BH CV ECHO MEAS - LVOT AREA: 3.1 CM2
BH CV ECHO MEAS - LVOT DIAM: 2 CM
BH CV ECHO MEAS - LVPWD: 1 CM
BH CV ECHO MEAS - MED PEAK E' VEL: 11.7 CM/SEC
BH CV ECHO MEAS - MV A MAX VEL: 118 CM/SEC
BH CV ECHO MEAS - MV DEC TIME: 0.34 MSEC
BH CV ECHO MEAS - MV E MAX VEL: 87 CM/SEC
BH CV ECHO MEAS - MV E/A: 0.74
BH CV ECHO MEAS - PA ACC TIME: 0.23 SEC
BH CV ECHO MEAS - PA PR(ACCEL): -24 MMHG
BH CV ECHO MEAS - PA V2 MAX: 165 CM/SEC
BH CV ECHO MEAS - RAP SYSTOLE: 3 MMHG
BH CV ECHO MEAS - RVSP: 31 MMHG
BH CV ECHO MEAS - SV(LVOT): 81.1 ML
BH CV ECHO MEAS - SV(MOD-SP2): 80.2 ML
BH CV ECHO MEAS - SV(MOD-SP4): 71 ML
BH CV ECHO MEAS - TAPSE (>1.6): 2.9 CM
BH CV ECHO MEAS - TR MAX PG: 28.4 MMHG
BH CV ECHO MEAS - TR MAX VEL: 265.3 CM/SEC
BH CV ECHO MEASUREMENTS AVERAGE E/E' RATIO: 7.44
BH CV ECHO SHUNT ASSESSMENT PERFORMED (HIDDEN SCRIPTING): 1
BH CV VAS BP LEFT ARM: 295 MMHG
BH CV XLRA - RV BASE: 3.9 CM
BH CV XLRA - RV LENGTH: 6.6 CM
BH CV XLRA - RV MID: 3 CM
BH CV XLRA - TDI S': 11.5 CM/SEC
BILIRUB UR QL STRIP: NEGATIVE
BUN SERPL-MCNC: 11 MG/DL (ref 8–23)
BUN/CREAT SERPL: 15.7 (ref 7–25)
CALCIUM SPEC-SCNC: 9.6 MG/DL (ref 8.6–10.5)
CHLORIDE SERPL-SCNC: 104 MMOL/L (ref 98–107)
CHOLEST SERPL-MCNC: 237 MG/DL (ref 0–200)
CLARITY UR: CLEAR
CO2 SERPL-SCNC: 26 MMOL/L (ref 22–29)
COLOR UR: YELLOW
CREAT SERPL-MCNC: 0.7 MG/DL (ref 0.76–1.27)
DEPRECATED RDW RBC AUTO: 40.8 FL (ref 37–54)
EGFRCR SERPLBLD CKD-EPI 2021: 102.9 ML/MIN/1.73
EOSINOPHIL # BLD AUTO: 0.01 10*3/MM3 (ref 0–0.4)
EOSINOPHIL NFR BLD AUTO: 0.1 % (ref 0.3–6.2)
ERYTHROCYTE [DISTWIDTH] IN BLOOD BY AUTOMATED COUNT: 12.9 % (ref 12.3–15.4)
GLUCOSE BLDC GLUCOMTR-MCNC: 107 MG/DL (ref 70–130)
GLUCOSE BLDC GLUCOMTR-MCNC: 115 MG/DL (ref 70–130)
GLUCOSE SERPL-MCNC: 100 MG/DL (ref 65–99)
GLUCOSE UR STRIP-MCNC: NEGATIVE MG/DL
HCT VFR BLD AUTO: 38.9 % (ref 37.5–51)
HDLC SERPL-MCNC: 64 MG/DL (ref 40–60)
HGB BLD-MCNC: 13.2 G/DL (ref 13–17.7)
HGB UR QL STRIP.AUTO: NEGATIVE
IMM GRANULOCYTES # BLD AUTO: 0.02 10*3/MM3 (ref 0–0.05)
IMM GRANULOCYTES NFR BLD AUTO: 0.3 % (ref 0–0.5)
KETONES UR QL STRIP: ABNORMAL
LDLC SERPL CALC-MCNC: 165 MG/DL (ref 0–100)
LDLC/HDLC SERPL: 2.54 {RATIO}
LEFT ATRIUM VOLUME INDEX: 23.9 ML/M2
LEUKOCYTE ESTERASE UR QL STRIP.AUTO: NEGATIVE
LV EF 2D ECHO EST: 55 %
LYMPHOCYTES # BLD AUTO: 1.29 10*3/MM3 (ref 0.7–3.1)
LYMPHOCYTES NFR BLD AUTO: 16.2 % (ref 19.6–45.3)
MAXIMAL PREDICTED HEART RATE: 156 BPM
MCH RBC QN AUTO: 29.3 PG (ref 26.6–33)
MCHC RBC AUTO-ENTMCNC: 33.9 G/DL (ref 31.5–35.7)
MCV RBC AUTO: 86.3 FL (ref 79–97)
MONOCYTES # BLD AUTO: 0.43 10*3/MM3 (ref 0.1–0.9)
MONOCYTES NFR BLD AUTO: 5.4 % (ref 5–12)
NEUTROPHILS NFR BLD AUTO: 6.2 10*3/MM3 (ref 1.7–7)
NEUTROPHILS NFR BLD AUTO: 77.7 % (ref 42.7–76)
NITRITE UR QL STRIP: NEGATIVE
NRBC BLD AUTO-RTO: 0 /100 WBC (ref 0–0.2)
PH UR STRIP.AUTO: 8 [PH] (ref 5–8)
PHOSPHATE SERPL-MCNC: 4.4 MG/DL (ref 2.5–4.5)
PLATELET # BLD AUTO: 203 10*3/MM3 (ref 140–450)
PMV BLD AUTO: 11.7 FL (ref 6–12)
POTASSIUM SERPL-SCNC: 4.2 MMOL/L (ref 3.5–5.2)
PROT UR QL STRIP: NEGATIVE
RBC # BLD AUTO: 4.51 10*6/MM3 (ref 4.14–5.8)
SODIUM SERPL-SCNC: 140 MMOL/L (ref 136–145)
SP GR UR STRIP: 1.07 (ref 1–1.03)
STRESS TARGET HR: 133 BPM
TRIGL SERPL-MCNC: 51 MG/DL (ref 0–150)
UROBILINOGEN UR QL STRIP: ABNORMAL
VLDLC SERPL-MCNC: 8 MG/DL (ref 5–40)
WBC NRBC COR # BLD: 7.97 10*3/MM3 (ref 3.4–10.8)

## 2023-01-10 PROCEDURE — G0378 HOSPITAL OBSERVATION PER HR: HCPCS

## 2023-01-10 PROCEDURE — 93306 TTE W/DOPPLER COMPLETE: CPT

## 2023-01-10 PROCEDURE — 93306 TTE W/DOPPLER COMPLETE: CPT | Performed by: INTERNAL MEDICINE

## 2023-01-10 PROCEDURE — 96375 TX/PRO/DX INJ NEW DRUG ADDON: CPT

## 2023-01-10 PROCEDURE — 25010000002 ONDANSETRON PER 1 MG: Performed by: INTERNAL MEDICINE

## 2023-01-10 PROCEDURE — 80061 LIPID PANEL: CPT | Performed by: NURSE PRACTITIONER

## 2023-01-10 PROCEDURE — 25010000002 DEXAMETHASONE SODIUM PHOSPHATE 100 MG/10ML SOLUTION: Performed by: PSYCHIATRY & NEUROLOGY

## 2023-01-10 PROCEDURE — 25010000002 PROCHLORPERAZINE 10 MG/2ML SOLUTION: Performed by: PSYCHIATRY & NEUROLOGY

## 2023-01-10 PROCEDURE — 80069 RENAL FUNCTION PANEL: CPT | Performed by: INTERNAL MEDICINE

## 2023-01-10 PROCEDURE — 99232 SBSQ HOSP IP/OBS MODERATE 35: CPT | Performed by: STUDENT IN AN ORGANIZED HEALTH CARE EDUCATION/TRAINING PROGRAM

## 2023-01-10 PROCEDURE — 25010000002 DIPHENHYDRAMINE PER 50 MG: Performed by: PSYCHIATRY & NEUROLOGY

## 2023-01-10 PROCEDURE — 81003 URINALYSIS AUTO W/O SCOPE: CPT | Performed by: EMERGENCY MEDICINE

## 2023-01-10 PROCEDURE — 85025 COMPLETE CBC W/AUTO DIFF WBC: CPT | Performed by: INTERNAL MEDICINE

## 2023-01-10 PROCEDURE — 97162 PT EVAL MOD COMPLEX 30 MIN: CPT

## 2023-01-10 PROCEDURE — 96361 HYDRATE IV INFUSION ADD-ON: CPT

## 2023-01-10 PROCEDURE — 96376 TX/PRO/DX INJ SAME DRUG ADON: CPT

## 2023-01-10 PROCEDURE — 99214 OFFICE O/P EST MOD 30 MIN: CPT | Performed by: PSYCHIATRY & NEUROLOGY

## 2023-01-10 PROCEDURE — 82962 GLUCOSE BLOOD TEST: CPT

## 2023-01-10 PROCEDURE — 92523 SPEECH SOUND LANG COMPREHEN: CPT | Performed by: SPEECH-LANGUAGE PATHOLOGIST

## 2023-01-10 RX ORDER — SODIUM CHLORIDE 9 MG/ML
100 INJECTION, SOLUTION INTRAVENOUS CONTINUOUS
Status: DISCONTINUED | OUTPATIENT
Start: 2023-01-10 | End: 2023-01-12 | Stop reason: HOSPADM

## 2023-01-10 RX ORDER — DIPHENHYDRAMINE HYDROCHLORIDE 50 MG/ML
25 INJECTION INTRAMUSCULAR; INTRAVENOUS EVERY 6 HOURS
Status: COMPLETED | OUTPATIENT
Start: 2023-01-10 | End: 2023-01-11

## 2023-01-10 RX ORDER — PROCHLORPERAZINE EDISYLATE 5 MG/ML
10 INJECTION INTRAMUSCULAR; INTRAVENOUS EVERY 6 HOURS
Status: COMPLETED | OUTPATIENT
Start: 2023-01-10 | End: 2023-01-11

## 2023-01-10 RX ADMIN — PROCHLORPERAZINE EDISYLATE 10 MG: 5 INJECTION INTRAMUSCULAR; INTRAVENOUS at 16:33

## 2023-01-10 RX ADMIN — ACETAMINOPHEN 650 MG: 325 TABLET ORAL at 09:37

## 2023-01-10 RX ADMIN — PROCHLORPERAZINE EDISYLATE 10 MG: 5 INJECTION INTRAMUSCULAR; INTRAVENOUS at 23:05

## 2023-01-10 RX ADMIN — DEXAMETHASONE SODIUM PHOSPHATE 10 MG: 10 INJECTION, SOLUTION INTRAMUSCULAR; INTRAVENOUS at 16:35

## 2023-01-10 RX ADMIN — MECLIZINE HYDROCHLORIDE 12.5 MG: 25 TABLET ORAL at 12:48

## 2023-01-10 RX ADMIN — DIPHENHYDRAMINE HYDROCHLORIDE 25 MG: 50 INJECTION INTRAMUSCULAR; INTRAVENOUS at 23:05

## 2023-01-10 RX ADMIN — ONDANSETRON 4 MG: 2 INJECTION INTRAMUSCULAR; INTRAVENOUS at 07:41

## 2023-01-10 RX ADMIN — SODIUM CHLORIDE 100 ML/HR: 9 INJECTION, SOLUTION INTRAVENOUS at 16:33

## 2023-01-10 RX ADMIN — SENNOSIDES AND DOCUSATE SODIUM 2 TABLET: 50; 8.6 TABLET ORAL at 20:15

## 2023-01-10 RX ADMIN — DIPHENHYDRAMINE HYDROCHLORIDE 25 MG: 50 INJECTION INTRAMUSCULAR; INTRAVENOUS at 16:35

## 2023-01-10 RX ADMIN — ONDANSETRON 4 MG: 2 INJECTION INTRAMUSCULAR; INTRAVENOUS at 14:43

## 2023-01-10 RX ADMIN — ATORVASTATIN CALCIUM 80 MG: 40 TABLET, FILM COATED ORAL at 20:15

## 2023-01-10 RX ADMIN — Medication 10 ML: at 20:15

## 2023-01-10 NOTE — PROGRESS NOTES
Neurology Note    Patient:  Joseph Garcia    YOB: 1960    REFERRING PHYSICIAN:  Ana Khalil,*    CHIEF COMPLAINT:    Dizziness, headache    HISTORY OF PRESENT ILLNESS:   The patient reports having a 4/10 occipital headache, h/o migraine. He has nausea, photo and sonophobia, dizziness worse with head movements. Unable to eat 22 nausea.    Past Medical History:  Past Medical History:   Diagnosis Date   • Hyperlipidemia        Past Surgical History:  Past Surgical History:   Procedure Laterality Date   • CYSTOSCOPY, URETEROSCOPY, RETROGRADE PYELOGRAM, STONE EXTRACTION, STENT INSERTION Left 12/18/2021    Procedure: CYSTOSCOPY URETEROSCOPY RETROGRADE PYELOGRAM STONE EXTRACTION STENT INSERTION;  Surgeon: Rick Hargrove MD;  Location: [facility];  Service: Urology;  Laterality: Left;   • KNEE SURGERY     • SHOULDER SURGERY         Social History:   Social History     Socioeconomic History   • Marital status:    Tobacco Use   • Smoking status: Former   • Smokeless tobacco: Never   Vaping Use   • Vaping Use: Never used   Substance and Sexual Activity   • Alcohol use: Never   • Drug use: Defer   • Sexual activity: Defer        Family History:   History reviewed. No pertinent family history.    Medications Prior to Admission:    Prior to Admission medications    Medication Sig Start Date End Date Taking? Authorizing Provider   Aspirin 81 MG capsule Take 81 mg by mouth Daily.    Provider, MD Jennifer   atorvastatin (LIPITOR) 40 MG tablet Take 40 mg by mouth Daily. 12/9/19   Emergency, Nurse YOMI Rogers   CAMBIA 50 MG pack TAKE 1 PACKET BY MOUTH AT ONSET OF HEADACHE ONCE DAILY 12/12/19   Emergency, Nurse Epic, RN   cetirizine (zyrTEC) 10 MG tablet Take 10 mg by mouth every night at bedtime. 11/25/19   Emergency, Nurse Sue RN   Diclofenac Sodium (Pennsaid) 2 % solution Pennsaid 20 mg/gram/actuation (2 %) topical soln in metered-dose pump    Emergency, Nurse Epic, RN   diphenhydrAMINE-APAP,  sleep, (TYLENOL PM EXTRA STRENGTH PO) Take  by mouth.    Emergency, Nurse YOMI Rogers   FLECTOR 1.3 % patch patch APPLY 1 PATCH TO AFFECTED AREA(S) TWICE DAILY 12/12/19   Lucie, Nurse Sue RN   fluticasone (FLONASE) 50 MCG/ACT nasal spray 2 sprays Daily. 9/26/19   Lucie, Nurse Epic, RN   neomycin-polymyxin-hydrocortisone (CORTISPORIN) 3.5-65260-8 otic solution INSTILL 3 DROPS INTO AFFECTED EAR(S) 3 TO 4 TIMES DAILY 11/21/19   Lucie, Nurse Sue RN   nitrofurantoin, macrocrystal-monohydrate, (Macrobid) 100 MG capsule Take 1 capsule by mouth Every Night. 12/19/21   Benitez Casas DO   ondansetron ODT (Zofran ODT) 4 MG disintegrating tablet Place 1 tablet on the tongue Every 8 (Eight) Hours As Needed for Nausea or Vomiting. 12/19/21   Benitez Casas DO   PENNSAID 2 % solution APPLY 2 PUMPS TO AFFECTED AREA(S) TWICE DAILY 11/11/19   Lucie, Nurse Epic, RN   phenazopyridine (Pyridium) 200 MG tablet Take 1 tablet by mouth 3 (Three) Times a Day As Needed for Bladder Spasms. 12/19/21   Benitez Casas DO   tadalafil (CIALIS) 20 MG tablet TK 1 T PO DAILY 1 HOUR BEFORE NEEDED 12/5/19   Nurse Sue Faulkner RN   tamsulosin (FLOMAX) 0.4 MG capsule 24 hr capsule Take 1 capsule by mouth Daily. 12/20/21   Benitez Casas DO   tiZANidine (ZANAFLEX) 4 MG tablet Take 4 mg by mouth 3 (Three) Times a Day As Needed. 12/12/19   Lucie, Nurse Sue RN   traMADol (ULTRAM) 50 MG tablet Take 50 mg by mouth Every 8 (Eight) Hours. 10/17/19   Emergency, Nurse Sue RN       Allergies:  Ibuprofen      Review of system  Review of Systems   Eyes: Positive for visual disturbance.   Gastrointestinal: Positive for nausea.   Neurological: Positive for dizziness and headaches.   All other systems reviewed and are negative.      Vitals:    01/10/23 1457   BP: 119/79   Pulse: 70   Resp: 16   Temp: 97 °F (36.1 °C)   SpO2: 100%       Physical exam  Physical Exam  Constitutional:       Appearance: He is  ill-appearing.   HENT:      Head: Normocephalic and atraumatic.   Eyes:      Extraocular Movements: Extraocular movements intact.      Pupils: Pupils are equal, round, and reactive to light.   Cardiovascular:      Rate and Rhythm: Normal rate and regular rhythm.   Pulmonary:      Effort: Pulmonary effort is normal.   Musculoskeletal:      Cervical back: Neck supple.   Neurological:      General: No focal deficit present.      Deep Tendon Reflexes: Babinski sign absent on the right side. Babinski sign absent on the left side.      Comments: Photophobic, speech clear, VFF, no facial droop, no limb drift.           Lab Results   Component Value Date    WBC 7.97 01/10/2023    HGB 13.2 01/10/2023    HCT 38.9 01/10/2023    MCV 86.3 01/10/2023     01/10/2023     Lab Results   Component Value Date    GLUCOSE 100 (H) 01/10/2023    BUN 11 01/10/2023    CREATININE 0.70 (L) 01/10/2023    EGFRIFNONA 66 12/19/2021    BCR 15.7 01/10/2023    CO2 26.0 01/10/2023    CALCIUM 9.6 01/10/2023    ALBUMIN 4.2 01/10/2023    AST 23 01/09/2023    ALT 10 01/09/2023     ECG 12 Lead ED Triage Standing Order; Weak / Dizzy / AMS (Order [id number])  Order-Level Documents:    Scan on 1/9/2023 1817 by New Onbase, [place]: ECG 12-LEAD         Author: -- Service: -- Author Type: --   Filed:  Date of Service:  Creation Time:    Status: (Other)      Test Reason : STROKE  Blood Pressure :   */*   mmHG  Vent. Rate :  77 BPM     Atrial Rate :  77 BPM     P-R Int : 162 ms          QRS Dur :  88 ms      QT Int : 432 ms       P-R-T Axes :  19  26  31 degrees     QTc Int : 488 ms     Sinus rhythm with occasional premature ventricular complexes  Nonspecific T wave abnormality  Abnormal ECG  When compared with ECG of 18-DEC-2021 13:50,  premature ventricular complexes are now present  Nonspecific T wave abnormality now evident in Lateral leads  QT has lengthened     Referred By: ED MD           Confirmed By:             Radiological Studies:  Adult  Transthoracic Echo Complete W/ Cont if Necessary Per Protocol (With Agitated Saline)    Result Date: 1/10/2023  •  Left ventricular systolic function is normal. Estimated left ventricular EF = 55% •  Left ventricular diastolic function was normal. •  Trace mitral valve regurgitation is present. •  Mild tricuspid valve regurgitation is present •  Calculated right ventricular systolic pressure from tricuspid regurgitation is 31 mmHg. •  Saline test results are negative for intracardiac shunting.. •  No cardiac source for embolus is seen on this study.     CT Angiogram Neck    Result Date: 1/9/2023  CT ANGIOGRAM HEAD W AI ANALYSIS OF LVO, CT ANGIOGRAM NECK Date of Exam: 1/9/2023 5:24 PM EST Indication: Neuro deficit, acute stroke suspected. Comparison: Noncontrast CT head performed concurrently Technique: CTA of the head and neck was performed after the uneventful intravenous administration of 100 mL Isovue-300. Reconstructed coronal and sagittal images were also obtained. In addition, a 3-D volume rendered image was created for interpretation.  Automated exposure control and iterative reconstruction methods were used. Findings: Apices are grossly clear. Evaluation of the neck soft tissues demonstrates no pathologic adenopathy or unexpected soft tissue neck mass. The salivary glands appear symmetric. Evaluation of the osseous structures demonstrates multilevel spondylosis, without evidence of acute fracture or aggressive osseous lesion. Patent aortic arch demonstrating typical 3 vessel branching. On the right, there is no significant atherosclerotic narrowing of the ICA origin, 0% by the set criteria. Similar 0% narrowing is present on the left. The left vertebral artery is diffusely diminutive, otherwise patent. The right vertebral artery is normal in course and caliber. Intracranially, the carotid siphons demonstrate no significant atherosclerotic narrowing. The anterior cerebral arteries are normal in course and  caliber bilaterally. The right middle cerebral artery demonstrates no evidence of flow-limiting stenosis, large vessel occlusion or aneurysmal dilatation. The left middle cerebral artery is similarly normal in course and caliber. The vertebrobasilar system is patent. The posterior cerebral arteries are normal in course and caliber bilaterally.     Impression: Essentially normal CT angiogram of the head and neck. There is no evidence of high-grade flow-limiting stenosis, large vessel occlusion or aneurysmal dilatation. Electronically Signed: Ayden Cazares  1/9/2023 6:04 PM EST  Workstation ID: [id number]    MRI Brain Without Contrast    Result Date: 1/9/2023  MRI BRAIN WO CONTRAST Date of Exam: 1/9/2023 8:17 PM EST Indication: Neuro deficit, acute, stroke suspected.  Comparison: None available. Technique:  Routine multiplanar/multisequence sequence images of the brain were obtained without contrast administration. Findings: No foci of restricted diffusion or abnormal susceptibility. No intra-axial signal abnormalities. CSF spaces/ventricles are age appropriate. No abnormal extra-axial fluid collection. Cerebellar tonsils in normal position. Pituitary gland normal in size. No suprasellar mass. Major intracranial flow voids present. No acute skull abnormality. No fluid in the visualized paranasal sinuses/middle ear cavities     Impression: No evidence of acute infarct. No acute intracranial process demonstrated Electronically Signed: Nick Portillo  1/9/2023 8:45 PM EST  Workstation ID: [id number]    XR Chest 1 View    Result Date: 1/9/2023  XR CHEST 1 VW Date of Exam: 1/9/2023 4:50 PM EST Indication: Weak/Dizzy/AMS triage protocol. Comparison: None available. Findings: There is no acute airspace disease. Benign calcified nodule is present in the right lower lobe. Heart size is normal. Mild degenerative endplate spurring is present in the thoracic spine. No pleural effusion, pneumothorax, or acute osseous abnormality is   identified.     Impression: No acute chest finding. Electronically Signed: Brittanie Lincoln  1/9/2023 5:02 PM EST  Workstation ID: [id number]    CT Head Without Contrast Stroke Protocol    Result Date: 1/9/2023  CT HEAD WO CONTRAST STROKE PROTOCOL Date of Exam: 1/9/2023 5:20 PM EST Indication: Neuro deficit, acute, stroke suspected. Comparison: None available. Technique: Axial CT images were obtained of the head without contrast administration.  Reconstructed coronal and sagittal images were also obtained. Automated exposure control and iterative construction methods were used. FINDINGS: Gray-white differentiation is maintained and there is no evidence of intracranial hemorrhage, mass or mass effect. Age-related changes of the brain are present including volume loss and typical periventricular sequela of chronic small vessel ischemia. There is otherwise no evidence of intracranial hemorrhage, mass or mass effect. The ventricles are normal in size and configuration accounting for surrounding volume loss. The orbits are normal and the paranasal sinuses are grossly clear.     Age-related changes of the brain as above, otherwise without evidence of acute intracranial abnormality.  Electronically Signed: Ayden Cazares  1/9/2023 5:33 PM EST  Workstation ID: [id number]    CT Angiogram Head w AI Analysis of LVO    Result Date: 1/9/2023  CT ANGIOGRAM HEAD W AI ANALYSIS OF LVO, CT ANGIOGRAM NECK Date of Exam: 1/9/2023 5:24 PM EST Indication: Neuro deficit, acute stroke suspected. Comparison: Noncontrast CT head performed concurrently Technique: CTA of the head and neck was performed after the uneventful intravenous administration of 100 mL Isovue-300. Reconstructed coronal and sagittal images were also obtained. In addition, a 3-D volume rendered image was created for interpretation.  Automated exposure control and iterative reconstruction methods were used. Findings: Apices are grossly clear. Evaluation of the neck soft tissues  demonstrates no pathologic adenopathy or unexpected soft tissue neck mass. The salivary glands appear symmetric. Evaluation of the osseous structures demonstrates multilevel spondylosis, without evidence of acute fracture or aggressive osseous lesion. Patent aortic arch demonstrating typical 3 vessel branching. On the right, there is no significant atherosclerotic narrowing of the ICA origin, 0% by the set criteria. Similar 0% narrowing is present on the left. The left vertebral artery is diffusely diminutive, otherwise patent. The right vertebral artery is normal in course and caliber. Intracranially, the carotid siphons demonstrate no significant atherosclerotic narrowing. The anterior cerebral arteries are normal in course and caliber bilaterally. The right middle cerebral artery demonstrates no evidence of flow-limiting stenosis, large vessel occlusion or aneurysmal dilatation. The left middle cerebral artery is similarly normal in course and caliber. The vertebrobasilar system is patent. The posterior cerebral arteries are normal in course and caliber bilaterally.     Impression: Essentially normal CT angiogram of the head and neck. There is no evidence of high-grade flow-limiting stenosis, large vessel occlusion or aneurysmal dilatation. Electronically Signed: Ayden Cazares  1/9/2023 6:04 PM EST  Workstation ID: [id number]    CT CEREBRAL PERFUSION WITH & WITHOUT CONTRAST    Result Date: 1/9/2023  CT CEREBRAL PERFUSION W WO CONTRAST Date of Exam: 1/9/2023 5:24 PM EST Indication: Neuro deficit, acute stroke suspected.  Comparison: CT head 1/9/2023 Technique: Axial CT images of the brain were obtained prior to and after the administration of Isovue-370 IV contrast. Core blood volume, core blood flow, mean transit time, and Tmax images were obtained utilizing the Rapid software protocol. A limited CT angiogram of the head was also performed to measure the blood vessel density. The radiation dose reduction device was  turned on for each scan per the ALARA (As Low as Reasonably Achievable) protocol.  CT PERFUSION BRAIN: Cerebral blood flow, blood volume and mean transit time maps are symmetric without large perfusion defect. CBF<30% volume: 0 mL Tmax>6sec volume: 5 mL within the inferior anterior right temporal lobe. This is likely artifactual. Mismatch volume: 5 mL Mismatch ratio: Infinite     1. 1. No significant area of cerebral ischemia. No evidence of core infarct.  Electronically Signed: Winston Cortez  1/9/2023 6:01 PM EST  Workstation ID: [id number]        During this visit the following were done:  Labs Reviewed [x]    Labs Ordered []    Radiology Reports Reviewed [x]    Radiology Ordered []    EKG, echo, and/or stress test reviewed [x]    EEG results reviewed  []    EEG reviewed and interpreted per myself   []    Discussed case with neurointerventionalist or neuroradiologist []    Referring Provider Records Reviewed []    ER Records Reviewed []    Hospital Records Reviewed []    History Obtained From Family []    Radiological images view and Interpreted per myself [x]    Case Discussed with referring provider []     Decision to obtain and request outside records  []        Assessment and Plan     Dizziness and headache, photophobia, favor vestibular migraine. MRI/MRA brain negative, personally reviewed. No fever or nuchal rigidity to suggest a CNS infection.   - IVF.   - Decadron, Compazine and Benadryl IV.                Electronically signed by Leandro Rahman MD on 1/10/2023 at 15:10 EST

## 2023-01-10 NOTE — CONSULTS
Order noted for diabetes education per stroke protocol. A1c 5.5%. No history of diabetes noted per chart review. Does not qualify for OP diabetes/stroke class. Thank you.

## 2023-01-10 NOTE — CASE MANAGEMENT/SOCIAL WORK
Discharge Planning Assessment  [facility]     Patient Name: Joseph Garcia  MRN: [id number]  Today's Date: 1/10/2023    Admit Date: 1/9/2023    Plan: home   Discharge Needs Assessment     Row Name 01/10/23 1155       Living Environment    People in Home spouse    Current Living Arrangements home    Primary Care Provided by self    Provides Primary Care For no one    Family Caregiver if Needed spouse    Quality of Family Relationships helpful;involved;supportive    Able to Return to Prior Arrangements yes       Transition Planning    Patient/Family Anticipates Transition to home with family    Transportation Anticipated family or friend will provide       Discharge Needs Assessment    Equipment Currently Used at Home none               Discharge Plan     Row Name 01/10/23 1156       Plan    Plan home    Plan Comments Mr. Garcia resides in [facility] with his spouse.  He is independent with ADL's and does not use any DME.  Denies current home health or PT.  Plan is home at discharge.    Final Discharge Disposition Code 01 - home or self-care              Continued Care and Services - Admitted Since 1/9/2023    Coordination has not been started for this encounter.       Expected Discharge Date and Time     Expected Discharge Date Expected Discharge Time    Jan 12, 2023          Demographic Summary     Row Name 01/10/23 1154       General Information    Reason for Consult discharge planning    Preferred Language English    General Information Comments PCP - Brandon GRIFFIN               Functional Status     Row Name 01/10/23 1154       Functional Status    Usual Activity Tolerance good    Current Activity Tolerance good       Functional Status, IADL    Medications independent    Meal Preparation independent    Housekeeping independent    Laundry independent    Shopping independent               Psychosocial    No documentation.                Abuse/Neglect    No documentation.                Legal    No documentation.                 Substance Abuse    No documentation.                Patient Forms    No documentation.                   Niurka Ventura RN

## 2023-01-10 NOTE — PLAN OF CARE
Goal Outcome Evaluation:  Plan of Care Reviewed With: patient        Progress:  (initial eval)   SLP evaluation completed. Will sign off for SLC, no observed deficits. Please see note for further details and recommendations.

## 2023-01-10 NOTE — PLAN OF CARE
Goal Outcome Evaluation:  Plan of Care Reviewed With: patient        Progress: improving  Outcome Evaluation: VSS on RA. Complaints of nasuea relieved with PRN meds. Patient is able to tolerate very little activity due to dizziness and nausea. Echo completed. Neurology following. Discussed plan of care with patient who verbalizes understanding.

## 2023-01-10 NOTE — THERAPY EVALUATION
Acute Care - Speech Language Pathology Initial Evaluation  [facility]   Cognitive-Communication Evaluation       Patient Name: Joseph Garcia  : 1960  MRN: [id number]  Today's Date: 1/10/2023               Admit Date: 2023     Visit Dx:  No diagnosis found.  Patient Active Problem List   Diagnosis   • Intractable pain   • Ureteral stone   • MAGALY (acute kidney injury) (HCC)   • Left ureteral stone   • Lower urinary tract symptoms (LUTS)   • Vertigo     Past Medical History:   Diagnosis Date   • Hyperlipidemia      Past Surgical History:   Procedure Laterality Date   • CYSTOSCOPY, URETEROSCOPY, RETROGRADE PYELOGRAM, STONE EXTRACTION, STENT INSERTION Left 2021    Procedure: CYSTOSCOPY URETEROSCOPY RETROGRADE PYELOGRAM STONE EXTRACTION STENT INSERTION;  Surgeon: Rick Hargrove MD;  Location: [facility];  Service: Urology;  Laterality: Left;   • KNEE SURGERY     • SHOULDER SURGERY         SLP Recommendation and Plan  SLP Diagnosis: functional speech/language skills, functional cognitive-linguistic skills (01/10/23 0915)           [facility] Criteria for Skilled Therapy Interventions Met: no problems identified which require skilled intervention (01/10/23 0915)  Anticipated Discharge Disposition (SLP): home with assist (01/10/23 0915)         Plan of Care Reviewed With: patient (01/10/23 0945)  Progress:  (initial eval) (01/10/23 0945)      SLP EVALUATION (last 72 hours)     SLP SLC Evaluation     Row Name 01/10/23 0915                   Communication Assessment/Intervention    Document Type evaluation  -CJ        Subjective Information complains of;pain  -CJ        Patient Observations alert;cooperative  -CJ        Patient/Family/Caregiver Comments/Observations no family present  -CJ        Patient Effort good  -CJ        Symptoms Noted During/After Treatment none  -CJ           General Information    Patient Profile Reviewed yes  -CJ        Pertinent History Of Current Problem Pt adm on stroke pathway w/  dizziness;has sig h/o ureteral stone, MAGALY. MRI and CT were negative for acute findings. Pt reported initial word finding difficulties but that has since resolved  -CJ        Precautions/Limitations, Vision other (see comments)  pt reports headache behind eyes  -CJ        Precautions/Limitations, Hearing WFL;for purposes of eval  -CJ        Prior Level of Function-Communication WFL  -        Plans/Goals Discussed with patient  -CJ        Barriers to Rehab none identified  -CJ        Patient's Goals for Discharge return to home  -           Pain    Additional Documentation Pain Scale: FACES Pre/Post-Treatment (Group)  -           Pain Scale: FACES Pre/Post-Treatment    Pain: FACES Scale, Pretreatment 4-->hurts little more  -CJ        Posttreatment Pain Rating 4-->hurts little more  -CJ        Pain Location generalized  -        Pain Location - head  -CJ        Pre/Posttreatment Pain Comment RN made aware  -           Comprehension Assessment/Intervention    Comprehension Assessment/Intervention Reading Comprehension;Auditory Comprehension  -           Auditory Comprehension Assessment/Intervention    Auditory Comprehension (Communication) WFL  -           Reading Comprehension Assessment/Intervention    Reading Comprehension (Communication) WFL  -           Expression Assessment/Intervention    Expression Assessment/Intervention verbal expression;graphic expression  -           Verbal Expression Assessment/Intervention    Verbal Expression WFL  -           Graphic Expression Assessment/Intervention    Graphic Expression WFL  -        Graphic Expression to Dictation WFL  -           Oral Motor Structure and Function    Oral Motor Structure and Function WFL  -        Dentition Assessment natural, present and adequate  -        Mucosal Quality moist, healthy  -           Oral Musculature and Cranial Nerve Assessment    Oral Motor General Assessment WFL  -           Motor Speech  Assessment/Intervention    Motor Speech Function WFL  -           Cognitive Assessment Intervention- SLP    Cognitive Function (Cognition) WFL  -CJ           SLP Evaluation Clinical Impressions    SLP Diagnosis functional speech/language skills;functional cognitive-linguistic skills  -CJ        SLC Criteria for Skilled Therapy Interventions Met no problems identified which require skilled intervention  -CJ        Functional Impact no impact on function  -           Recommendations    Therapy Frequency (SLP SLC) evaluation only  -CJ        Anticipated Discharge Disposition (SLP) home with assist  -CJ              User Key  (r) = Recorded By, (t) = Taken By, (c) = Cosigned By    Initials Name Effective Dates    Lea Stoll MS CCC-SLP 06/16/21 -                    EDUCATION  The patient has been educated in the following areas:     Cognitive Impairment Communication Impairment.                      Time Calculation:      Time Calculation- SLP     Row Name 01/10/23 0946             Time Calculation- SLP    SLP Start Time 0915  -      SLP Received On 01/10/23  -         Untimed Charges    85596-JR Eval Speech and Production w/ Language Minutes 40  -CJ         Total Minutes    Untimed Charges Total Minutes 40  -CJ       Total Minutes 40  -CJ            User Key  (r) = Recorded By, (t) = Taken By, (c) = Cosigned By    Initials Name Provider Type    Lea Stoll MS CCC-SLP Speech and Language Pathologist                Therapy Charges for Today     Code Description Service Date Service Provider Modifiers Qty    [id number] HC ST EVAL SPEECH AND PROD W LANG  3 1/10/2023 Lea Gill MS CCC-SLP GN 1                     Lea Glil MS CCC-SLP  1/10/2023

## 2023-01-10 NOTE — PLAN OF CARE
Problem: Fall Injury Risk  Goal: Absence of Fall and Fall-Related Injury  Outcome: Ongoing, Progressing  Intervention: Identify and Manage Contributors  Description: Develop a fall prevention plan with the patient and caregiver/family.  Provide reorientation, appropriate sensory stimulation and routines with changes in mental status to decrease risk of fall.  Promote use of personal vision and auditory aids.  Assess assistance level required for safe and effective self-care; provide support as needed, such as toileting, mobilization. For age 65 and older, implement timed toileting with assistance.  Encourage physical activity, such as performance of mobility and self-care at highest level of patient ability, multicomponent exercise program and provision of appropriate assistive devices.  If fall occurs, assess the severity of injury; implement fall injury protocol. Determine the cause and revise fall injury prevention plan.  Regularly review medication contribution to fall risk; adjust medication administration times to minimize risk of falling.  Consider risk related to polypharmacy and age.  Balance adequate pain management with potential for oversedation.  Flowsheets  Taken 1/10/2023 0009  Self-Care Promotion:   independence encouraged   BADL personal objects within reach  Taken 1/9/2023 2041  Medication Review/Management:   medications reviewed   high-risk medications identified  Intervention: Promote Injury-Free Environment  Description: Provide a safe, barrier-free environment that encourages independent activity.  Keep care area uncluttered and well-lighted.  Determine need for increased observation or monitoring.  Avoid use of devices that minimize mobility, such as restraints or indwelling urinary catheter.  Flowsheets  Taken 1/10/2023 0000  Safety Promotion/Fall Prevention:   activity supervised   assistive device/personal items within reach   clutter free environment maintained   elopement precautions    fall prevention program maintained   nonskid shoes/slippers when out of bed   safety round/check completed  Taken 1/9/2023 2200  Safety Promotion/Fall Prevention:   activity supervised   assistive device/personal items within reach   clutter free environment maintained   elopement precautions   fall prevention program maintained   nonskid shoes/slippers when out of bed   safety round/check completed  Taken 1/9/2023 2041  Safety Promotion/Fall Prevention:   assistive device/personal items within reach   activity supervised   clutter free environment maintained   elopement precautions   fall prevention program maintained   nonskid shoes/slippers when out of bed   safety round/check completed   Goal Outcome Evaluation:

## 2023-01-10 NOTE — THERAPY EVALUATION
Patient Name: Joseph Garcia  : 1960    MRN: [id number]                              Today's Date: 1/10/2023       Admit Date: 2023    Visit Dx: No diagnosis found.  Patient Active Problem List   Diagnosis   • Intractable pain   • Ureteral stone   • MAGALY (acute kidney injury) (HCC)   • Left ureteral stone   • Lower urinary tract symptoms (LUTS)   • Vertigo     Past Medical History:   Diagnosis Date   • Hyperlipidemia      Past Surgical History:   Procedure Laterality Date   • CYSTOSCOPY, URETEROSCOPY, RETROGRADE PYELOGRAM, STONE EXTRACTION, STENT INSERTION Left 2021    Procedure: CYSTOSCOPY URETEROSCOPY RETROGRADE PYELOGRAM STONE EXTRACTION STENT INSERTION;  Surgeon: Rick Hargrove MD;  Location: [facility];  Service: Urology;  Laterality: Left;   • KNEE SURGERY     • SHOULDER SURGERY        General Information     Row Name 01/10/23 1535          Physical Therapy Time and Intention    Document Type evaluation  -KG     Mode of Treatment physical therapy  -KG     Row Name 01/10/23 1535          General Information    Patient Profile Reviewed yes  -KG     Prior Level of Function independent:;all household mobility;gait;transfer;ADL's;dressing;bathing  -KG     Existing Precautions/Restrictions fall;other (see comments)  dizziness; nausea; light and sound sensitivity  -KG     Barriers to Rehab medically complex  -KG     Row Name 01/10/23 1535          Living Environment    People in Home spouse;parent(s)  -KG     Row Name 01/10/23 1535          Home Main Entrance    Number of Stairs, Main Entrance none  -KG     Row Name 01/10/23 1535          Stairs Within Home, Primary    Number of Stairs, Within Home, Primary none  -KG     Row Name 01/10/23 1535          Cognition    Orientation Status (Cognition) oriented x 3  -KG     Row Name 01/10/23 1535          Safety Issues, Functional Mobility    Safety Issues Affecting Function (Mobility) awareness of need for assistance;insight into  deficits/self-awareness;safety precaution awareness;safety precautions follow-through/compliance  -KG     Impairments Affecting Function (Mobility) balance;coordination;endurance/activity tolerance;postural/trunk control;pain;strength  -KG           User Key  (r) = Recorded By, (t) = Taken By, (c) = Cosigned By    Initials Name Provider Type    KG Natalya Stewart, PT Physical Therapist               Mobility     Row Name 01/10/23 1536          Bed Mobility    Bed Mobility scooting/bridging  -KG     Scooting/Bridging [place] (Bed Mobility) dependent (less than 25% patient effort);2 person assist;verbal cues  -KG     Assistive Device (Bed Mobility) draw sheet  -KG     Comment, (Bed Mobility) Pt unable to actively assist with bed mobility. Declined any EOB mobility due to pain and nausea.  -KG     Row Name 01/10/23 1536          Transfers    Comment, (Transfers) Pt declined any OOB activities this date.  -KG     Row Name 01/10/23 1536          Bed-Chair Transfer    Bed-Chair [place] (Transfers) unable to assess  -KG     [facility] Name 01/10/23 1536          Sit-Stand Transfer    Sit-Stand [place] (Transfers) unable to assess  -KG     Row Name 01/10/23 1536          Gait/Stairs (Locomotion)    [place] Level (Gait) unable to assess  -KG     Comment, (Gait/Stairs) Ambulation deferred.  -KG           User Key  (r) = Recorded By, (t) = Taken By, (c) = Cosigned By    Initials Name Provider Type    KG Natalya Stewart, PT Physical Therapist               Obj/Interventions     Row Name 01/10/23 1537          Range of Motion Comprehensive    General Range of Motion no range of motion deficits identified  -KG     Comment, General Range of Motion B LE WFL  -KG     [facility] Name 01/10/23 1537          Strength Comprehensive (MMT)    Comment, General Manual Muscle Testing (MMT) Assessment unable to formally assess; B LE grossly 2+/5  -KG     [facility] Name 01/10/23 1537          Motor Skills    Therapeutic Exercise  hip;knee;ankle  -KG     Row Name 01/10/23 1537          Hip (Therapeutic Exercise)    Hip (Therapeutic Exercise) strengthening exercise  -KG     Hip Strengthening (Therapeutic Exercise) bilateral;aBduction;aDduction;heel slides;supine;5 repetitions  -KG     Row Name 01/10/23 1537          Knee (Therapeutic Exercise)    Knee (Therapeutic Exercise) strengthening exercise  -KG     Knee Strengthening (Therapeutic Exercise) bilateral;SLR (straight leg raise);SAQ (short arc quad);supine;5 repetitions  -KG     Row Name 01/10/23 1537          Ankle (Therapeutic Exercise)    Ankle (Therapeutic Exercise) strengthening exercise  -KG     Ankle Strengthening (Therapeutic Exercise) bilateral;dorsiflexion;plantarflexion;supine;5 repetitions  -KG     Row Name 01/10/23 1537          Sensory Assessment (Somatosensory)    Sensory Assessment (Somatosensory) LE sensation intact  -KG           User Key  (r) = Recorded By, (t) = Taken By, (c) = Cosigned By    Initials Name Provider Type    KG Natalya Stewart N, PT Physical Therapist               Goals/Plan     Row Name 01/10/23 1539          Bed Mobility Goal 1 (PT)    Activity/Assistive Device (Bed Mobility Goal 1, PT) sit to supine;supine to sit  -KG     [place] Level/Cues Needed (Bed Mobility Goal 1, PT) minimum assist (75% or more patient effort)  -KG     Time Frame (Bed Mobility Goal 1, PT) 2 weeks  -KG     Progress/Outcomes (Bed Mobility Goal 1, PT) goal ongoing  -KG     Row Name 01/10/23 1539          Transfer Goal 1 (PT)    Activity/Assistive Device (Transfer Goal 1, PT) sit-to-stand/stand-to-sit;bed-to-chair/chair-to-bed  -KG     [place] Level/Cues Needed (Transfer Goal 1, PT) minimum assist (75% or more patient effort)  -KG     Time Frame (Transfer Goal 1, PT) 2 weeks  -KG     Progress/Outcome (Transfer Goal 1, PT) goal ongoing  -KG     Row Name 01/10/23 1539          Gait Training Goal 1 (PT)    Activity/Assistive Device (Gait Training Goal 1, PT) gait (walking  locomotion)  -KG     [place] Level (Gait Training Goal 1, PT) moderate assist (50-74% patient effort)  -KG     Distance (Gait Training Goal 1, PT) 50 feet  -KG     Time Frame (Gait Training Goal 1, PT) 2 weeks  -KG     Progress/Outcome (Gait Training Goal 1, PT) goal ongoing  -KG     Row Name 01/10/23 7381          Therapy Assessment/Plan (PT)    Planned Therapy Interventions (PT) balance training;bed mobility training;gait training;strengthening;transfer training  -KG           User Key  (r) = Recorded By, (t) = Taken By, (c) = Cosigned By    Initials Name Provider Type    KG Natalya Stewart, PT Physical Therapist               Clinical Impression     Row Name 01/10/23 1569          Pain    Additional Documentation Pain Scale: FACES Pre/Post-Treatment (Group)  -KG     Row Name 01/10/23 3471          Pain Scale: FACES Pre/Post-Treatment    Pain: FACES Scale, Pretreatment 4-->hurts little more  -KG     Posttreatment Pain Rating 4-->hurts little more  -KG     Pain Location generalized  -KG     Pain Location - head  -KG     Row Name 01/10/23 7806          Plan of Care Review    Plan of Care Reviewed With patient  -KG     Outcome Evaluation PT initial evaluation completed for pt presenting with generalized weakness, c/o dizziness and nausea, and decreased functional mobility. Pt required depA x2 for scooting toward HOB. Pt declined any EOB or OOB activities this date. Pt's decreased independence warrants PT skilled care. Recommend D/C to  rehab facility.  -KG     Row Name 01/10/23 7980          Therapy Assessment/Plan (PT)    Patient/Family Therapy Goals Statement (PT) return to PLOF  -KG     Rehab Potential (PT) good, to achieve stated therapy goals  -KG     Criteria for Skilled Interventions Met (PT) yes;skilled treatment is necessary  -KG     Therapy Frequency (PT) daily  -KG     Row Name 01/10/23 5477          Vital Signs    Pre Systolic BP Rehab 108  -KG     Pre Treatment Diastolic BP 76  -KG      Pretreatment Heart Rate (beats/min) 78  -KG     Posttreatment Heart Rate (beats/min) 80  -KG     Pre SpO2 (%) 99  -KG     O2 Delivery Pre Treatment room air  -KG     Post SpO2 (%) 99  -KG     O2 Delivery Post Treatment room air  -KG     Pre Patient Position Supine  -KG     Intra Patient Position Supine  -KG     Post Patient Position Supine  -KG     Row Name 01/10/23 1538          Positioning and Restraints    Pre-Treatment Position in bed  -KG     Post Treatment Position bed  -KG     In Bed notified nsg;supine;call light within reach;encouraged to call for assist;exit alarm on;with family/caregiver;side rails up x3  -KG           User Key  (r) = Recorded By, (t) = Taken By, (c) = Cosigned By    Initials Name Provider Type    Natalya Otero PT Physical Therapist               Outcome Measures     Row Name 01/10/23 1540 01/10/23 0800       How much help from another person do you currently need...    Turning from your back to your side while in flat bed without using bedrails? 2  -KG 3  -CG    Moving from lying on back to sitting on the side of a flat bed without bedrails? 2  -KG 3  -CG    Moving to and from a bed to a chair (including a wheelchair)? 1  -KG 3  -CG    Standing up from a chair using your arms (e.g., wheelchair, bedside chair)? 1  -KG 3  -CG    Climbing 3-5 steps with a railing? 1  -KG 3  -CG    To walk in hospital room? 1  -KG 3  -CG    AM-PAC 6 Clicks Score (PT) 8  -KG 18  -CG    Highest level of mobility 3 --> Sat at edge of bed  -KG 6 --> Walked 10 steps or more  -CG    Row Name 01/10/23 1540          Functional Assessment    Outcome Measure Options AM-PAC 6 Clicks Basic Mobility (PT)  -KG           User Key  (r) = Recorded By, (t) = Taken By, (c) = Cosigned By    Initials Name Provider Type    Ld [place] RN Registered Nurse    Natalya Otero PT Physical Therapist                             Physical Therapy Education     Title: PT OT SLP Therapies (In Progress)      Topic: Physical Therapy (In Progress)     Point: Mobility training (In Progress)     Learning Progress Summary           Patient Acceptance, E, NR by KG at 1/10/2023 1402                   Point: Home exercise program (Not Started)     Learner Progress:  Not documented in this visit.          Point: Body mechanics (In Progress)     Learning Progress Summary           Patient Acceptance, E, NR by KG at 1/10/2023 1402                   Point: Precautions (In Progress)     Learning Progress Summary           Patient Acceptance, E, NR by KG at 1/10/2023 1402                               User Key     Initials Effective Dates Name Provider Type Discipline    KG 05/22/20 -  Natalya Stewart PT Physical Therapist PT              PT Recommendation and Plan  Planned Therapy Interventions (PT): balance training, bed mobility training, gait training, strengthening, transfer training  Plan of Care Reviewed With: patient  Outcome Evaluation: PT initial evaluation completed for pt presenting with generalized weakness, c/o dizziness and nausea, and decreased functional mobility. Pt required depA x2 for scooting toward HOB. Pt declined any EOB or OOB activities this date. Pt's decreased independence warrants PT skilled care. Recommend D/C to  rehab facility.     Time Calculation:    PT Charges     Row Name 01/10/23 1402             Time Calculation    Start Time 1402  -KG      PT Received On 01/10/23  -KG      PT Goal Re-Cert Due Date 01/20/23  -KG         Untimed Charges    PT Eval/Re-eval Minutes 31  -KG         Total Minutes    Untimed Charges Total Minutes 31  -KG       Total Minutes 31  -KG            User Key  (r) = Recorded By, (t) = Taken By, (c) = Cosigned By    Initials Name Provider Type    KG Natalya Stewart, PT Physical Therapist              Therapy Charges for Today     Code Description Service Date Service Provider Modifiers Qty    63894095788  PT EVAL MOD COMPLEXITY 3 1/10/2023 Natalya Stewart  N, PT GP 1    69604015123 HC PT THER SUPP EA 15 MIN 1/10/2023 Natalya Stewrat, PT GP 2          PT G-Codes  Outcome Measure Options: AM-PAC 6 Clicks Basic Mobility (PT)  AM-PAC 6 Clicks Score (PT): 8  PT Discharge Summary  Anticipated Discharge Disposition (PT): inpatient rehabilitation facility    Courtney Stewart, PT  1/10/2023

## 2023-01-10 NOTE — PLAN OF CARE
Goal Outcome Evaluation:  Plan of Care Reviewed With: patient           Outcome Evaluation: PT initial evaluation completed for pt presenting with generalized weakness, c/o dizziness and nausea, and decreased functional mobility. Pt required depA x2 for scooting toward HOB. Pt declined any EOB or OOB activities this date. Pt's decreased independence warrants PT skilled care. Recommend D/C to IP rehab facility.
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
## 2023-01-11 LAB
CRP SERPL-MCNC: <0.3 MG/DL (ref 0–0.5)
TSH SERPL DL<=0.05 MIU/L-ACNC: 0.8 UIU/ML (ref 0.27–4.2)

## 2023-01-11 PROCEDURE — 99214 OFFICE O/P EST MOD 30 MIN: CPT | Performed by: PSYCHIATRY & NEUROLOGY

## 2023-01-11 PROCEDURE — 96361 HYDRATE IV INFUSION ADD-ON: CPT

## 2023-01-11 PROCEDURE — 96376 TX/PRO/DX INJ SAME DRUG ADON: CPT

## 2023-01-11 PROCEDURE — 99232 SBSQ HOSP IP/OBS MODERATE 35: CPT | Performed by: NURSE PRACTITIONER

## 2023-01-11 PROCEDURE — 97530 THERAPEUTIC ACTIVITIES: CPT

## 2023-01-11 PROCEDURE — 86140 C-REACTIVE PROTEIN: CPT

## 2023-01-11 PROCEDURE — 25010000002 DIPHENHYDRAMINE PER 50 MG: Performed by: PSYCHIATRY & NEUROLOGY

## 2023-01-11 PROCEDURE — 97166 OT EVAL MOD COMPLEX 45 MIN: CPT

## 2023-01-11 PROCEDURE — 84443 ASSAY THYROID STIM HORMONE: CPT

## 2023-01-11 PROCEDURE — 97535 SELF CARE MNGMENT TRAINING: CPT

## 2023-01-11 PROCEDURE — G0378 HOSPITAL OBSERVATION PER HR: HCPCS

## 2023-01-11 PROCEDURE — 25010000002 PROCHLORPERAZINE 10 MG/2ML SOLUTION: Performed by: PSYCHIATRY & NEUROLOGY

## 2023-01-11 RX ORDER — SCOLOPAMINE TRANSDERMAL SYSTEM 1 MG/1
1 PATCH, EXTENDED RELEASE TRANSDERMAL
Status: DISCONTINUED | OUTPATIENT
Start: 2023-01-11 | End: 2023-01-12 | Stop reason: HOSPADM

## 2023-01-11 RX ADMIN — FLUTICASONE PROPIONATE 2 SPRAY: 50 SPRAY, METERED NASAL at 08:48

## 2023-01-11 RX ADMIN — SCOPALAMINE 1 PATCH: 1 PATCH, EXTENDED RELEASE TRANSDERMAL at 16:05

## 2023-01-11 RX ADMIN — ASPIRIN 325 MG ORAL TABLET 325 MG: 325 PILL ORAL at 08:48

## 2023-01-11 RX ADMIN — Medication 10 ML: at 20:40

## 2023-01-11 RX ADMIN — DIPHENHYDRAMINE HYDROCHLORIDE 25 MG: 50 INJECTION INTRAMUSCULAR; INTRAVENOUS at 04:16

## 2023-01-11 RX ADMIN — SENNOSIDES AND DOCUSATE SODIUM 2 TABLET: 50; 8.6 TABLET ORAL at 08:48

## 2023-01-11 RX ADMIN — PROCHLORPERAZINE EDISYLATE 10 MG: 5 INJECTION INTRAMUSCULAR; INTRAVENOUS at 11:38

## 2023-01-11 RX ADMIN — ATORVASTATIN CALCIUM 80 MG: 40 TABLET, FILM COATED ORAL at 20:40

## 2023-01-11 RX ADMIN — SENNOSIDES AND DOCUSATE SODIUM 2 TABLET: 50; 8.6 TABLET ORAL at 20:40

## 2023-01-11 RX ADMIN — PROCHLORPERAZINE EDISYLATE 10 MG: 5 INJECTION INTRAMUSCULAR; INTRAVENOUS at 04:16

## 2023-01-11 RX ADMIN — DIPHENHYDRAMINE HYDROCHLORIDE 25 MG: 50 INJECTION INTRAMUSCULAR; INTRAVENOUS at 11:38

## 2023-01-11 RX ADMIN — TAMSULOSIN HYDROCHLORIDE 0.4 MG: 0.4 CAPSULE ORAL at 08:48

## 2023-01-11 RX ADMIN — SODIUM CHLORIDE 100 ML/HR: 9 INJECTION, SOLUTION INTRAVENOUS at 02:42

## 2023-01-11 NOTE — PLAN OF CARE
Goal Outcome Evaluation:  Plan of Care Reviewed With: patient        Progress: improving  Outcome Evaluation: Pt completed dynamic balance activites and x3 reps STS with FWx and x2 reps STS without AD with notable increase in unsteadiness without AD. Mobility limited d/t dizziness and fatigue at this date. Pt gave good effort with BLE therex. Will continue to progress IPPT POC as tolerated.   Propranolol Counseling:  I discussed with the patient the risks of propranolol including but not limited to low heart rate, low blood pressure, low blood sugar, restlessness and increased cold sensitivity. They should call the office if they experience any of these side effects.

## 2023-01-11 NOTE — PLAN OF CARE
Problem: Fall Injury Risk  Goal: Absence of Fall and Fall-Related Injury  Outcome: Ongoing, Progressing  Intervention: Identify and Manage Contributors  Description: Develop a fall prevention plan with the patient and caregiver/family.  Provide reorientation, appropriate sensory stimulation and routines with changes in mental status to decrease risk of fall.  Promote use of personal vision and auditory aids.  Assess assistance level required for safe and effective self-care; provide support as needed, such as toileting, mobilization. For age 65 and older, implement timed toileting with assistance.  Encourage physical activity, such as performance of mobility and self-care at highest level of patient ability, multicomponent exercise program and provision of appropriate assistive devices.  If fall occurs, assess the severity of injury; implement fall injury protocol. Determine the cause and revise fall injury prevention plan.  Regularly review medication contribution to fall risk; adjust medication administration times to minimize risk of falling.  Consider risk related to polypharmacy and age.  Balance adequate pain management with potential for oversedation.  Flowsheets  Taken 1/10/2023 2000  Medication Review/Management:   medications reviewed   high-risk medications identified  Taken 1/10/2023 0009  Self-Care Promotion:   independence encouraged   BADL personal objects within reach  Intervention: Promote Injury-Free Environment  Description: Provide a safe, barrier-free environment that encourages independent activity.  Keep care area uncluttered and well-lighted.  Determine need for increased observation or monitoring.  Avoid use of devices that minimize mobility, such as restraints or indwelling urinary catheter.  Flowsheets  Taken 1/10/2023 2300 by Guillermina Clifton, PCT  Safety Promotion/Fall Prevention:   activity supervised   assistive device/personal items within reach   clutter free environment maintained    nonskid shoes/slippers when out of bed   room organization consistent   safety round/check completed  Taken 1/10/2023 2200 by Winston Sheldon RN  Safety Promotion/Fall Prevention:   activity supervised   assistive device/personal items within reach   clutter free environment maintained   elopement precautions   fall prevention program maintained   nonskid shoes/slippers when out of bed   safety round/check completed  Taken 1/10/2023 2000 by Winston Sheldon RN  Safety Promotion/Fall Prevention:   activity supervised   assistive device/personal items within reach   clutter free environment maintained   fall prevention program maintained   elopement precautions   nonskid shoes/slippers when out of bed   safety round/check completed     Problem: Adult Inpatient Plan of Care  Goal: Absence of Hospital-Acquired Illness or Injury  Intervention: Identify and Manage Fall Risk  Description: Perform standard risk assessment on admission using a validated tool or comprehensive approach appropriate to the patient; reassess fall risk frequently, with change in status or transfer to another level of care.  Communicate fall injury risk to interprofessional healthcare team.  Determine need for increased observation, equipment and environmental modification, such as low bed, signage and supportive, nonskid footwear.  Adjust safety measures to individual developmental age, stage and identified risk factors.  Reinforce the importance of safety and physical activity with patient and family.  Perform regular intentional rounding to assess need for position change, pain assessment and personal needs, including assistance with toileting.  Recent Flowsheet Documentation  Taken 1/10/2023 2200 by Winston Sheldon, RN  Safety Promotion/Fall Prevention:   activity supervised   assistive device/personal items within reach   clutter free environment maintained   elopement precautions   fall prevention program maintained   nonskid shoes/slippers when out of  bed   safety round/check completed  Taken 1/10/2023 2000 by Winston Sheldon RN  Safety Promotion/Fall Prevention:   activity supervised   assistive device/personal items within reach   clutter free environment maintained   fall prevention program maintained   elopement precautions   nonskid shoes/slippers when out of bed   safety round/check completed  Intervention: Prevent Skin Injury  Description: Perform a screening for skin injury risk, such as pressure or moisture associated skin damage on admission and at regular intervals throughout hospital stay.  Keep all areas of skin (especially folds) clean and dry.  Maintain adequate skin hydration.  Relieve and redistribute pressure and protect bony prominences; implement measures based on patient-specific risk factors.  Match turning and repositioning schedule to clinical condition.  Encourage weight shift frequently; assist with reposition if unable to complete independently.  Float heels off bed; avoid pressure on the Achilles tendon.  Keep skin free from extended contact with medical devices.  Encourage functional activity and mobility, as early as tolerated.  Use aids (e.g., slide boards, mechanical lift) during transfer.  Recent Flowsheet Documentation  Taken 1/10/2023 2200 by Winston Sheldon, RN  Body Position: position changed independently  Taken 1/10/2023 2000 by Winston Sheldon RN  Body Position: position changed independently  Skin Protection:   adhesive use limited   incontinence pads utilized   protective footwear used   skin-to-skin areas padded   tubing/devices free from skin contact  Intervention: Prevent and Manage VTE (Venous Thromboembolism) Risk  Description: Assess for VTE (venous thromboembolism) risk.  Encourage and assist with early ambulation.  Initiate and maintain compression or other therapy, as indicated, based on identified risk in accordance with organizational protocol and provider order.  Encourage both active and passive leg exercises while in  bed, if unable to ambulate.  Recent Flowsheet Documentation  Taken 1/10/2023 2200 by Winston Sheldon, RN  Activity Management: activity adjusted per tolerance  Taken 1/10/2023 2000 by Winston Sheldon, RN  Activity Management:   activity adjusted per tolerance   dorsiflexion/plantar flexion performed  VTE Prevention/Management:   bilateral   sequential compression devices on  Range of Motion: ROM (range of motion) performed  Goal: Optimal Comfort and Wellbeing  Intervention: Provide Person-Centered Care  Description: Use a family-focused approach to care.  Develop trust and rapport by proactively providing information, encouraging questions, addressing concerns and offering reassurance.  Acknowledge emotional response to hospitalization.  Recognize and utilize personal coping strategies.  Honor spiritual and cultural preferences.  Recent Flowsheet Documentation  Taken 1/10/2023 2000 by Winstno Sheldon, RN  Trust Relationship/Rapport:   care explained   choices provided   empathic listening provided   emotional support provided   questions answered   questions encouraged   reassurance provided   thoughts/feelings acknowledged   Goal Outcome Evaluation:

## 2023-01-11 NOTE — THERAPY EVALUATION
Patient Name: Joseph Garcia  : 1960    MRN: [id number]                              Today's Date: 2023       Admit Date: 2023    Visit Dx: No diagnosis found.  Patient Active Problem List   Diagnosis   • Intractable pain   • Ureteral stone   • MAGALY (acute kidney injury) (HCC)   • Left ureteral stone   • Lower urinary tract symptoms (LUTS)   • Vertigo     Past Medical History:   Diagnosis Date   • Hyperlipidemia      Past Surgical History:   Procedure Laterality Date   • CYSTOSCOPY, URETEROSCOPY, RETROGRADE PYELOGRAM, STONE EXTRACTION, STENT INSERTION Left 2021    Procedure: CYSTOSCOPY URETEROSCOPY RETROGRADE PYELOGRAM STONE EXTRACTION STENT INSERTION;  Surgeon: Rick Hargrove MD;  Location: [facility];  Service: Urology;  Laterality: Left;   • KNEE SURGERY     • SHOULDER SURGERY        General Information     Row Name 23 133          OT Time and Intention    Document Type evaluation  -KF     Mode of Treatment occupational therapy  -KF     Row Name 23 133          General Information    Patient Profile Reviewed yes  -KF     Prior Level of Function independent:;bed mobility;ADL's;transfer;all household mobility  -KF     Existing Precautions/Restrictions fall;other (see comments)  light/sound sensitivity, dizziness  -KF     Barriers to Rehab medically complex  -KF     Row Name 23 1336          Occupational Profile    Environmental Supports and Barriers (Occupational Profile) [place] shower available at home; no DME; 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  -KF     Row Name 23 1336          Living Environment    People in Home spouse;parent(s)  -     Row Name 23 1336          Home Main Entrance    Number of Stairs, Main Entrance two  small 4 inch steps  -     Row Name 23 1336          Stairs Within Home, Primary    Number of Stairs, Within Home, Primary none  -KF     Row Name 23 1336          Cognition    Orientation Status (Cognition) oriented x 3  -KF     Row Name  01/11/23 1336          Safety Issues, Functional Mobility    Safety Issues Affecting Function (Mobility) insight into deficits/self-awareness;awareness of need for assistance;safety precaution awareness  -     Impairments Affecting Function (Mobility) balance;coordination;endurance/activity tolerance;postural/trunk control;pain;strength  -KF           User Key  (r) = Recorded By, (t) = Taken By, (c) = Cosigned By    Initials Name Provider Type    KF Janette Rivas, OT Occupational Therapist                 Mobility/ADL's     Row Name 01/11/23 1337          Bed Mobility    Bed Mobility scooting/bridging;supine-sit;sit-supine  -KF     Scooting/Bridging [place] (Bed Mobility) contact guard;verbal cues  -KF     Supine-Sit [place] (Bed Mobility) contact guard;verbal cues  -KF     Sit-Supine [place] (Bed Mobility) contact guard;verbal cues  -KF     Bed Mobility, Safety Issues decreased use of arms for pushing/pulling;decreased use of legs for bridging/pushing;impaired trunk control for bed mobility  -KF     Assistive Device (Bed Mobility) bed rails;head of bed elevated  -KF     Comment, (Bed Mobility) cues for sequencing throughout  -     Row Name 01/11/23 1337          Transfers    Transfers sit-stand transfer;toilet transfer;stand-sit transfer  -     Comment, (Transfers) cues for hand placement and sequencing, use of HHA and IV pole for support, recommend FWW trial pending progress  -     Row Name 01/11/23 1337          Sit-Stand Transfer    Sit-Stand [place] (Transfers) minimum assist (75% patient effort)  -     Assistive Device (Sit-Stand Transfers) --  BUE support  -     Row Name 01/11/23 1337          Stand-Sit Transfer    Stand-Sit [place] (Transfers) moderate assist (50% patient effort);1 person assist;verbal cues  -     Assistive Device (Stand-Sit Transfers) --  IV pole and HHA  -     Row Name 01/11/23 1337          Toilet Transfer    Type (Toilet Transfer)  stand-sit;sit-stand  -KF     [place] Level (Toilet Transfer) contact guard  -KF     Assistive Device (Toilet Transfer) commode;grab bars/safety frame  -     Row Name 01/11/23 1337          Functional Mobility    Functional Mobility- Ind. Level moderate assist (50% patient effort);1 person  -KF     Functional Mobility- Device --  BUE supported  -KF     Functional Mobility-Distance (Feet) 24  14+10  -KF     Functional Mobility- Safety Issues weight-shifting ability decreased;step length decreased;balance decreased during turns;loses balance backward  -KF     Functional Mobility- Comment unsteady throughout recommend use of FWW next session to progress  -     Row Name 01/11/23 1337          Activities of Daily Living    BADL Assessment/Intervention lower body dressing;toileting;grooming  -     Row Name 01/11/23 1337          Lower Body Dressing Assessment/Training    [place] Level (Lower Body Dressing) don;doff;shoes/slippers;moderate assist (50% patient effort)  -     Position (Lower Body Dressing) edge of bed sitting  -     Row Name 01/11/23 1337          Toileting Assessment/Training    [place] Level (Toileting) contact guard assist;adjust/manage clothing;perform perineal hygiene  -KF     Assistive Devices (Toileting) commode;grab bar/safety frame  -KF     Position (Toileting) supported standing;supported sitting  -     Row Name 01/11/23 1333          Grooming Assessment/Training    [place] Level (Grooming) wash face, hands;set up  -KF     Position (Grooming) supported standing;unsupported standing  -KF     Comment, (Grooming) wipes provided in standing intermittent supported with grab bars  -           User Key  (r) = Recorded By, (t) = Taken By, (c) = Cosigned By    Initials Name Provider Type    KF Janette Rivas, OT Occupational Therapist               Obj/Interventions     Row Name 01/11/23 7183          Sensory Assessment (Somatosensory)    Sensory Assessment  (Somatosensory) UE sensation intact  -KF     Row Name 01/11/23 1340          Vision Assessment/Intervention    Visual Impairment/Limitations WFL;other (see comments);visual/perceptual impairments present  dizziness  -KF     Row Name 01/11/23 1340          Range of Motion Comprehensive    General Range of Motion bilateral upper extremity ROM WFL  -KF     Row Name 01/11/23 1340          Strength Comprehensive (MMT)    General Manual Muscle Testing (MMT) Assessment upper extremity strength deficits identified  -KF     Comment, General Manual Muscle Testing (MMT) Assessment BUE grossly 4-/5  -KF     Row Name 01/11/23 1340          Motor Skills    Motor Skills coordination  -KF     Coordination bilateral;upper extremity;gross motor deficit;minimal impairment;bimanual skills  -KF     Row Name 01/11/23 1340          Balance    Balance Assessment sitting static balance;sitting dynamic balance;standing static balance;standing dynamic balance  -KF     Static Sitting Balance supervision  -KF     Dynamic Sitting Balance standby assist  -KF     Position, Sitting Balance unsupported;sitting edge of bed  -KF     Static Standing Balance contact guard  -KF     Dynamic Standing Balance moderate assist  -KF     Position/Device Used, Standing Balance supported  BUE supported  -KF     Balance Interventions standing;sit to stand;supported;minimal challenge;weight shifting activity;occupation based/functional task  -KF     Comment, Balance unsteady throughout [place] to modA dynamic standing  -KF           User Key  (r) = Recorded By, (t) = Taken By, (c) = Cosigned By    Initials Name Provider Type    KF Janette Rivas, OT Occupational Therapist               Goals/Plan     Row Name 01/11/23 1349          Transfer Goal 1 (OT)    Activity/Assistive Device (Transfer Goal 1, OT) bed-to-chair/chair-to-bed;commode;walker, rolling  -KF     [place] Level/Cues Needed (Transfer Goal 1, OT) contact guard required  -KF     Time Frame (Transfer  Goal 1, OT) long term goal (LTG);10 days  -KF     Progress/Outcome (Transfer Goal 1, OT) goal ongoing;new goal  -KF     Row Name 01/11/23 1346          Dressing Goal 1 (OT)    Activity/Device (Dressing Goal 1, OT) lower body dressing  AE PRN socks/shoes  -KF     [place]/Cues Needed (Dressing Goal 1, OT) contact guard required  -KF     Time Frame (Dressing Goal 1, OT) long term goal (LTG);10 days  -KF     Progress/Outcome (Dressing Goal 1, OT) new goal;goal ongoing  -KF     Row Name 01/11/23 1346          Grooming Goal 1 (OT)    Activity/Device (Grooming Goal 1, OT) oral care  completed tolerated standing sink side  -KF     [place] (Grooming Goal 1, OT) standby assist  -KF     Time Frame (Grooming Goal 1, OT) long term goal (LTG);10 days  -KF     Progress/Outcome (Grooming Goal 1, OT) goal ongoing;new goal  -KF     Row Name 01/11/23 1346          Therapy Assessment/Plan (OT)    Planned Therapy Interventions (OT) activity tolerance training;adaptive equipment training;BADL retraining;occupation/activity based interventions;strengthening exercise;transfer/mobility retraining;functional balance retraining;ROM/therapeutic exercise;patient/caregiver education/training  -KF           User Key  (r) = Recorded By, (t) = Taken By, (c) = Cosigned By    Initials Name Provider Type    Janette Adams, OT Occupational Therapist               Clinical Impression     Row Name 01/11/23 1348          Pain Assessment    Pretreatment Pain Rating 0/10 - no pain  -KF     Posttreatment Pain Rating 0/10 - no pain  -KF     Pre/Posttreatment Pain Comment tolerated  -KF     Pain Intervention(s) Repositioned;Ambulation/increased activity  -KF     Row Name 01/11/23 1348          Plan of Care Review    Plan of Care Reviewed With patient  -KF     Progress improving  -KF     Outcome Evaluation OT eval completed, Pt demonstrates deficits in strength, coordination, and balance compared to baseline for ADL and functional transfer  completion, completed steps 14ft modA BUE supported and 10ft functional mob BUE supported intermittent HHA, completed toileting clothing management and hygiene CGA, CGA STS, CGA bed mob, mod A LBD, recom IPOT d/c rehab pending progress  -KF     Row Name 01/11/23 1342          Therapy Assessment/Plan (OT)    Rehab Potential (OT) good, to achieve stated therapy goals  -KF     Criteria for Skilled Therapeutic Interventions Met (OT) yes;meets criteria;skilled treatment is necessary  -KF     Therapy Frequency (OT) daily  -KF     Row Name 01/11/23 1342          Therapy Plan Review/Discharge Plan (OT)    Equipment Needs Upon Discharge (OT) walker, rolling  TBD  -KF     Anticipated Discharge Disposition (OT) inpatient rehabilitation facility  -KF     Row Name 01/11/23 1342          Vital Signs    Pre Systolic BP Rehab 128  -KF     Pre Treatment Diastolic BP 84  -KF     Pretreatment Heart Rate (beats/min) 81  -KF     Pre SpO2 (%) 99  -KF     O2 Delivery Pre Treatment room air  -KF     Post SpO2 (%) 97  -KF     O2 Delivery Post Treatment room air  -KF     Pre Patient Position Supine  -KF     Intra Patient Position Standing  -KF     Post Patient Position Supine  -KF     Rest Breaks  2  -KF     Row Name 01/11/23 1342          Positioning and Restraints    Pre-Treatment Position in bed  -KF     Post Treatment Position bed  -KF     In Bed notified nsg;supine;fowlers;sitting EOB;call light within reach;encouraged to call for assist;exit alarm on;legs elevated;side rails up x2  -KF           User Key  (r) = Recorded By, (t) = Taken By, (c) = Cosigned By    Initials Name Provider Type    Janette Adams, OT Occupational Therapist               Outcome Measures     Row Name 01/11/23 3147          How much help from another is currently needed...    Putting on and taking off regular lower body clothing? 2  -KF     Bathing (including washing, rinsing, and drying) 2  -KF     Toileting (which includes using toilet bed pan or  urinal) 2  -KF     Putting on and taking off regular upper body clothing 3  -KF     Taking care of personal grooming (such as brushing teeth) 3  -KF     Eating meals 4  -KF     AM-PAC 6 Clicks Score (OT) 16  -KF     Row Name 01/11/23 0800          How much help from another person do you currently need...    Turning from your back to your side while in flat bed without using bedrails? 4  -AN     Moving from lying on back to sitting on the side of a flat bed without bedrails? 4  -AN     Moving to and from a bed to a chair (including a wheelchair)? 2  -AN     Standing up from a chair using your arms (e.g., wheelchair, bedside chair)? 2  -AN     Climbing 3-5 steps with a railing? 2  -AN     To walk in hospital room? 2  -AN     AM-PAC 6 Clicks Score (PT) 16  -AN     Highest level of mobility 5 --> Static standing  -AN     Row Name 01/11/23 1348          Functional Assessment    Outcome Measure Options AM-PAC 6 Clicks Daily Activity (OT)  -           User Key  (r) = Recorded By, (t) = Taken By, (c) = Cosigned By    Initials Name Provider Type    AN Cari Russell, RN Registered Nurse    Janette Adams, OT Occupational Therapist                Occupational Therapy Education     Title: PT OT SLP Therapies (In Progress)     Topic: Occupational Therapy (In Progress)     Point: ADL training (Done)     Description:   Instruct learner(s) on proper safety adaptation and remediation techniques during self care or transfers.   Instruct in proper use of assistive devices.              Learning Progress Summary           Patient Acceptance, E,TB,D, NR,DU,VU by  at 1/11/2023 5861                   Point: Home exercise program (Not Started)     Description:   Instruct learner(s) on appropriate technique for monitoring, assisting and/or progressing therapeutic exercises/activities.              Learner Progress:  Not documented in this visit.          Point: Precautions (Done)     Description:   Instruct learner(s) on  prescribed precautions during self-care and functional transfers.              Learning Progress Summary           Patient Acceptance, E,TB,D, NR,DU,VU by  at 1/11/2023 1349                   Point: Body mechanics (Done)     Description:   Instruct learner(s) on proper positioning and spine alignment during self-care, functional mobility activities and/or exercises.              Learning Progress Summary           Patient Acceptance, E,TB,D, NR,DU,VU by  at 1/11/2023 1349                               User Key     Initials Effective Dates Name Provider Type Discipline     06/16/21 -  Janette Rivas OT Occupational Therapist OT              OT Recommendation and Plan  Planned Therapy Interventions (OT): activity tolerance training, adaptive equipment training, BADL retraining, occupation/activity based interventions, strengthening exercise, transfer/mobility retraining, functional balance retraining, ROM/therapeutic exercise, patient/caregiver education/training  Therapy Frequency (OT): daily  Plan of Care Review  Plan of Care Reviewed With: patient  Progress: improving  Outcome Evaluation: OT eval completed, Pt demonstrates deficits in strength, coordination, and balance compared to baseline for ADL and functional transfer completion, completed steps 14ft modA BUE supported and 10ft functional mob BUE supported intermittent HHA, completed toileting clothing management and hygiene CGA, CGA STS, CGA bed mob, mod A LBD, recom IPOT d/c rehab pending progress     Time Calculation:    Time Calculation- OT     Row Name 01/11/23 1311             Time Calculation- OT    OT Start Time 1311  -KF      OT Received On 01/11/23  -KF      OT Goal Re-Cert Due Date 01/21/23  -KF         Timed Charges    39795 - OT Therapeutic Activity Minutes 2  -KF      54860 - OT Self Care/Mgmt Minutes 8  -KF         Untimed Charges    OT Eval/Re-eval Minutes 32  -KF         Total Minutes    Timed Charges Total Minutes 10  -KF       Untimed Charges Total Minutes 32  -KF       Total Minutes 42  -KF            User Key  (r) = Recorded By, (t) = Taken By, (c) = Cosigned By    Initials Name Provider Type    Jaentte Adams, OT Occupational Therapist              Therapy Charges for Today     Code Description Service Date Service Provider Modifiers Qty    [id number]  OT SELF CARE/MGMT/TRAIN EA 15 MIN 1/11/2023 Janette Rivas, OT GO 1    [id number]  OT EVAL MOD COMPLEXITY 3 1/11/2023 Janette Rivas OT GO 1               Janette Rivas OT  1/11/2023

## 2023-01-11 NOTE — PLAN OF CARE
Goal Outcome Evaluation:  Plan of Care Reviewed With: patient        Progress: improving  Outcome Evaluation: OT eval completed, Pt demonstrates deficits in strength, coordination, and balance compared to baseline for ADL and functional transfer completion, completed steps 14ft modA BUE supported and 10ft functional mob BUE supported intermittent HHA, completed toileting clothing management and hygiene CGA, CGA STS, CGA bed mob, mod A LBD, recom IPOT d/c rehab pending progress

## 2023-01-11 NOTE — PROGRESS NOTES
Stroke Progress Note       Chief Complaint: Dizziness and headache    Subjective    Subjective     Subjective:  Patient currently resting in the bed with lights out.  He is able to open his eyes for me today and tells me that his dizziness has subsided.  He was able to sit up in bed and eat some of his breakfast this morning without nausea.  He does continue to be generally weak and denies headache.    Review of Systems   Constitutional: Positive for activity change and fatigue. Negative for chills and fever.   HENT: Negative.    Eyes: Negative.    Respiratory: Negative.    Cardiovascular: Negative for chest pain and palpitations.   Gastrointestinal: Negative for blood in stool, diarrhea, nausea and vomiting.   Genitourinary: Negative.    Musculoskeletal: Negative.    Skin: Negative.    Neurological: Positive for dizziness and weakness (Generalized). Negative for seizures, speech difficulty, numbness and headaches.   Hematological: Negative.    Psychiatric/Behavioral: Negative.             Objective    Objective      Temp:  [96.3 °F (35.7 °C)-99 °F (37.2 °C)] 96.3 °F (35.7 °C)  Heart Rate:  [58-78] 73  Resp:  [16] 16  BP: (108-130)/(76-86) 130/86        Neurological Exam  Mental Status  Arousable to verbal stimuli. Oriented to person, place and time. Oriented to person, place, and time. Memory is normal. Speech is normal. Attention and concentration are normal. Fund of knowledge is appropriate for level of education.    Cranial Nerves  CN II: Vision test: Pupils are equal and round  Patient is unable to keep his eyes open when attempting to check pupil reactivity. Visual fields full to confrontation.  CN III, IV, VI: Extraocular movements intact bilaterally. Right ptosis.   Right pupil: 2 mm.   Left pupil: 2 mm.  CN V: Facial sensation is normal.  CN VII: Full and symmetric facial movement.  CN VIII: Hearing is intact bilaterally.    Motor  Normal muscle bulk throughout. No fasciculations present. Normal muscle  tone.  Generalized weakness noted  No evidence of drift in bilateral upper or lower extremities, 4 -/5 strength.    Sensory  Sensation is intact to light touch, pinprick, vibration and proprioception in all four extremities.    Coordination  Right: Heel-to-shin normal.Left: Heel-to-shin normal.  No obvious dysmetria noted.    Gait    Not observed.      Physical Exam  Vitals and nursing note reviewed.   Constitutional:       General: He is not in acute distress.     Appearance: He is ill-appearing.      Comments: Resting with eyes closed however awakens easily to verbal stimuli   HENT:      Head: Normocephalic and atraumatic.      Mouth/Throat:      Mouth: Mucous membranes are dry.   Eyes:      Extraocular Movements: Extraocular movements intact.      Comments: Pupils are equal and round  Patient is unable to keep his eyes open when attempting to check pupil reactivity   Cardiovascular:      Rate and Rhythm: Normal rate and regular rhythm.   Pulmonary:      Effort: Pulmonary effort is normal. No respiratory distress.   Skin:     General: Skin is warm and dry.   Neurological:      Mental Status: He is oriented to person, place, and time.      Cranial Nerves: No cranial nerve deficit.      Sensory: No sensory deficit.      Motor: Weakness (Generalized weakness) present.      Coordination: Coordination normal.   Psychiatric:         Attention and Perception: Attention normal.         Mood and Affect: Affect is flat.         Speech: Speech normal.         Behavior: Behavior normal. Behavior is cooperative.         Cognition and Memory: Cognition and memory normal.         Results Review:    I reviewed the patient's new clinical results.    MRI of the brain without contrast is negative for acute stroke or intracranial process.    CTA head/neck is negative for atherosclerotic disease, flow-limiting stenosis, or large vessel occlusive stroke.  Diminutive left vertebral artery noted.    CT perfusion is negative for large  vessel occlusive stroke.    Results for orders placed during the hospital encounter of 01/09/23    Adult Transthoracic Echo Complete W/ Cont if Necessary Per Protocol (With Agitated Saline)    Interpretation Summary  •  Left ventricular systolic function is normal. Estimated left ventricular EF = 55%  •  Left ventricular diastolic function was normal.  •  Trace mitral valve regurgitation is present.  •  Mild tricuspid valve regurgitation is present  •  Calculated right ventricular systolic pressure from tricuspid regurgitation is 31 mmHg.  •  Saline test results are negative for intracardiac shunting..  •  No cardiac source for embolus is seen on this study.  Left atrial cavity is borderline dilated    H/H13.2/30.9  Platelets 203  A1c 5.50  , total cholesterol 237  AST 23  ALT 10        Assessment/Plan     Assessment/Plan:    This is a 62-year-old male with known medical diagnoses of hyperlipidemia and remote tobacco abuse who presented to [facility] on 1/9/2023 with complaints of dizziness and nausea.  He was not a candidate for IV thrombolytic therapy as LKW was >4.5 hours and was not a candidate for endovascular therapy as CTA head/neck and CT perfusion were negative for large vessel occlusive stroke.  He was admitted to the hospitalist service for further stroke work-up.     Antiplatelet PTA: ASA 81 mg  Anticoagulant PTA: None      1. Dizziness and nausea  -MRI of the brain without contrast is negative for stroke, suspect patient's symptoms are related to a vestibular migraine  -NIHSS can be discontinued  -Patient did receive migraine cocktail yesterday, which did seem to help his dizziness and headache.  Patient tells me that he has not had a headache however Dr. Barber assures me that he did endorse a headache yesterday on his assessment.  -Patient was evaluated by PT yesterday who recommends inpatient rehabilitation at discharge given patient's decreased independence, case management  following for discharge placement needs.  -Follow-up with his general neurologist in 4 weeks  -Recommend cardiac monitor at discharge given patient's borderline dilated left atrial cavity  -We will check CRP, sed rate, and TSH  -Less likely viral meningitis as patient is improving and has remained afebrile without leukocytosis  -Suspect patient's weakness is related to decreased oral intake and sedating migraine medications    2. Hyperlipidemia  - and triglycerides 237  -Patient previously on atorvastatin 40 mg at home, recommend continuing 80 mg nightly    Plan of care was discussed with the patient, his wife Dr. Barber, and primary team.  Stroke neurology will continue to follow.  Please call with any questions or concerns.      Tisha Ward, APRN  01/11/23  09:56 EST
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
## 2023-01-12 VITALS
DIASTOLIC BLOOD PRESSURE: 82 MMHG | TEMPERATURE: 97.7 F | HEART RATE: 73 BPM | OXYGEN SATURATION: 96 % | BODY MASS INDEX: 32.35 KG/M2 | HEIGHT: 70 IN | SYSTOLIC BLOOD PRESSURE: 118 MMHG | RESPIRATION RATE: 16 BRPM | WEIGHT: 226 LBS

## 2023-01-12 PROBLEM — R52 INTRACTABLE PAIN: Status: RESOLVED | Noted: 2021-12-18 | Resolved: 2023-01-12

## 2023-01-12 LAB — ERYTHROCYTE [SEDIMENTATION RATE] IN BLOOD: 4 MM/HR (ref 0–20)

## 2023-01-12 PROCEDURE — 97530 THERAPEUTIC ACTIVITIES: CPT

## 2023-01-12 PROCEDURE — 99232 SBSQ HOSP IP/OBS MODERATE 35: CPT | Performed by: PSYCHIATRY & NEUROLOGY

## 2023-01-12 PROCEDURE — 85652 RBC SED RATE AUTOMATED: CPT

## 2023-01-12 PROCEDURE — 99239 HOSP IP/OBS DSCHRG MGMT >30: CPT | Performed by: NURSE PRACTITIONER

## 2023-01-12 PROCEDURE — G0378 HOSPITAL OBSERVATION PER HR: HCPCS

## 2023-01-12 RX ORDER — SCOLOPAMINE TRANSDERMAL SYSTEM 1 MG/1
1 PATCH, EXTENDED RELEASE TRANSDERMAL
Qty: 5 PATCH | Refills: 0 | Status: SHIPPED | OUTPATIENT
Start: 2023-01-14 | End: 2023-01-27

## 2023-01-12 RX ORDER — MECLIZINE HCL 12.5 MG/1
12.5 TABLET ORAL 3 TIMES DAILY PRN
Qty: 60 TABLET | Refills: 0 | Status: SHIPPED | OUTPATIENT
Start: 2023-01-12

## 2023-01-12 RX ORDER — ATORVASTATIN CALCIUM 40 MG/1
80 TABLET, FILM COATED ORAL DAILY
Qty: 30 TABLET | Refills: 0 | Status: SHIPPED | OUTPATIENT
Start: 2023-01-12

## 2023-01-12 RX ADMIN — FLUTICASONE PROPIONATE 2 SPRAY: 50 SPRAY, METERED NASAL at 08:00

## 2023-01-12 RX ADMIN — ASPIRIN 325 MG ORAL TABLET 325 MG: 325 PILL ORAL at 08:00

## 2023-01-12 RX ADMIN — SENNOSIDES AND DOCUSATE SODIUM 2 TABLET: 50; 8.6 TABLET ORAL at 08:00

## 2023-01-12 RX ADMIN — SODIUM CHLORIDE 100 ML/HR: 9 INJECTION, SOLUTION INTRAVENOUS at 01:25

## 2023-01-12 RX ADMIN — TAMSULOSIN HYDROCHLORIDE 0.4 MG: 0.4 CAPSULE ORAL at 08:00

## 2023-01-12 NOTE — PLAN OF CARE
Goal Outcome Evaluation:  Plan of Care Reviewed With: patient        Progress: improving  Outcome Evaluation: patient is now feeling better patient is independent with bed mobility transfers and gait he is waiting on someone to do the epley procedure. patient has met all goals for mobility and will go home with family assist at D/C

## 2023-01-12 NOTE — CASE MANAGEMENT/SOCIAL WORK
Case Management Discharge Note      Final Note: Mr. Garcia is being discharged home.  No needs identified.  Has transport home.                   Final Discharge Disposition Code: 01 - home or self-care

## 2023-01-12 NOTE — THERAPY TREATMENT NOTE
Patient Name: Joseph Garcia  : 1960    MRN: [id number]                              Today's Date: 2023       Admit Date: 2023    Visit Dx:     ICD-10-CM ICD-9-CM   1. Vertigo  R42 780.4     Patient Active Problem List   Diagnosis   • Ureteral stone   • MAGALY (acute kidney injury) (HCC)   • Left ureteral stone   • Lower urinary tract symptoms (LUTS)   • Vertigo     Past Medical History:   Diagnosis Date   • Hyperlipidemia      Past Surgical History:   Procedure Laterality Date   • CYSTOSCOPY, URETEROSCOPY, RETROGRADE PYELOGRAM, STONE EXTRACTION, STENT INSERTION Left 2021    Procedure: CYSTOSCOPY URETEROSCOPY RETROGRADE PYELOGRAM STONE EXTRACTION STENT INSERTION;  Surgeon: Rick Hargrove MD;  Location: [facility];  Service: Urology;  Laterality: Left;   • KNEE SURGERY     • SHOULDER SURGERY        General Information     Row Name 23 1327          Physical Therapy Time and Intention    Document Type therapy note (daily note)  -ROBY     Mode of Treatment physical therapy  -ROBY     Row Name 23 1327          General Information    Patient Profile Reviewed yes  -ROBY     Prior Level of Function independent:;bed mobility;ADL's;gait;transfer  -ROBY     Barriers to Rehab medically complex  -ROBY     Row Name 23 1327          Living Environment    People in Home spouse  -ROBY     Row Name 23 1327          Home Main Entrance    Number of Stairs, Main Entrance two  -ROBY     Row Name 23 1327          Cognition    Orientation Status (Cognition) oriented x 4  -ROBY     Row Name 23 1327          Safety Issues, Functional Mobility    Safety Issues Affecting Function (Mobility) safety precautions follow-through/compliance  -ROBY     Impairments Affecting Function (Mobility) balance;endurance/activity tolerance  -ROBY           User Key  (r) = Recorded By, (t) = Taken By, (c) = Cosigned By    Initials Name Provider Type    Lilian Riley PT Physical Therapist               Mobility     Row  Name 01/12/23 1328          Bed Mobility    Bed Mobility rolling left;rolling right;scooting/bridging;supine-sit  -ROBY     Rolling Left [place] (Bed Mobility) independent  -ROBY     Rolling Right [place] (Bed Mobility) independent  -ROBY     Scooting/Bridging [place] (Bed Mobility) independent  -ROBY     Supine-Sit [place] (Bed Mobility) independent  -ROBY     Row Name 01/12/23 1328          Bed-Chair Transfer    Bed-Chair [place] (Transfers) independent  -ROBY     Row Name 01/12/23 1328          Sit-Stand Transfer    Sit-Stand [place] (Transfers) independent  -ROBY     Row Name 01/12/23 1328          Gait/Stairs (Locomotion)    [place] Level (Gait) independent  -ROBY     Distance in Feet (Gait) 400  -ROBY     [place] Level (Stairs) independent  -ROBY     Number of Steps (Stairs) 12  -ROBY     Ascending Technique (Stairs) step-over-step  -ROBY     Descending Technique (Stairs) step-over-step  -ROBY     Comment, (Gait/Stairs) patient has no LOB with ambulation started to have mild dizziness after 300 ft patient was also able to go up and down 1flight of stairs without difficulty  -ROBY           User Key  (r) = Recorded By, (t) = Taken By, (c) = Cosigned By    Initials Name Provider Type    Lilian Riley, PT Physical Therapist               Obj/Interventions     Row Name 01/12/23 1331          Balance    Balance Assessment sitting static balance;sitting dynamic balance;standing static balance;standing dynamic balance  -ROBY     Static Sitting Balance independent  -ROBY     Dynamic Sitting Balance independent  -ROBY     Position, Sitting Balance unsupported;sitting edge of bed  -ROBY     Static Standing Balance independent  -ROBY     Dynamic Standing Balance independent  -ROBY     Position/Device Used, Standing Balance unsupported  -ROBY           User Key  (r) = Recorded By, (t) = Taken By, (c) = Cosigned By    Initials Name Provider Type    Lilian Riley, PT Physical Therapist               Goals/Plan      Row Name 01/12/23 1337          Bed Mobility Goal 1 (PT)    Activity/Assistive Device (Bed Mobility Goal 1, PT) sit to supine;supine to sit  -ROBY     [place] Level/Cues Needed (Bed Mobility Goal 1, PT) minimum assist (75% or more patient effort)  -ROBY     Time Frame (Bed Mobility Goal 1, PT) 2 weeks  -ROBY     Progress/Outcomes (Bed Mobility Goal 1, PT) goal met  -ROBY     Row Name 01/12/23 1337          Transfer Goal 1 (PT)    Activity/Assistive Device (Transfer Goal 1, PT) sit-to-stand/stand-to-sit;bed-to-chair/chair-to-bed  -ROBY     [place] Level/Cues Needed (Transfer Goal 1, PT) minimum assist (75% or more patient effort)  -ROBY     Time Frame (Transfer Goal 1, PT) 2 weeks  -ROBY     Progress/Outcome (Transfer Goal 1, PT) goal met  -ROBY     Row Name 01/12/23 1337          Gait Training Goal 1 (PT)    Activity/Assistive Device (Gait Training Goal 1, PT) gait (walking locomotion)  -ROBY     [place] Level (Gait Training Goal 1, PT) moderate assist (50-74% patient effort)  -ROBY     Distance (Gait Training Goal 1, PT) 50 feet  -ROBY     Time Frame (Gait Training Goal 1, PT) 2 weeks  -ROBY     Progress/Outcome (Gait Training Goal 1, PT) goal met  -ROBY     Row Name 01/12/23 1337          Therapy Assessment/Plan (PT)    Planned Therapy Interventions (PT) balance training;bed mobility training;gait training;stair training;transfer training  -ROBY           User Key  (r) = Recorded By, (t) = Taken By, (c) = Cosigned By    Initials Name Provider Type    Lilian Riley, PT Physical Therapist               Clinical Impression     Row Name 01/12/23 1335          Pain    Pretreatment Pain Rating 0/10 - no pain  -ROBY     Posttreatment Pain Rating 0/10 - no pain  -ROBY     Row Name 01/12/23 1332          Plan of Care Review    Plan of Care Reviewed With patient  -ROBY     Progress improving  -ROBY     Outcome Evaluation patient is now feeling better patient is independent with bed mobility transfers and gait he is waiting on  someone to do the epley procedure. patient has met all goals for mobility and will go home with family assist at D/C  -ROBY     Row Name 01/12/23 1975          Therapy Assessment/Plan (PT)    Patient/Family Therapy Goals Statement (PT) return to PLOF  -ROBY     Rehab Potential (PT) good, to achieve stated therapy goals  -ROBY     Criteria for Skilled Interventions Met (PT) yes;skilled treatment is necessary  -ROBY     Therapy Frequency (PT) daily  -ROBY     Row Name 01/12/23 1333          Vital Signs    Pre Systolic BP Rehab 118  -ROBY     Pre Treatment Diastolic BP 80  -ROBY     Post Systolic BP Rehab 134  -ROBY     Post Treatment Diastolic BP 76  -ROBY     Pretreatment Heart Rate (beats/min) 65  -ROBY     Posttreatment Heart Rate (beats/min) 74  -ROBY     Pre Patient Position Sitting  -ROBY     Intra Patient Position Standing  -ROBY     Post Patient Position Sitting  -ROBY     Row Name 01/12/23 1334          Positioning and Restraints    Pre-Treatment Position sitting in chair/recliner  -ROBY     Post Treatment Position chair  -ROBY     In Chair notified nsg;reclined;sitting;call light within reach;encouraged to call for assist;with family/caregiver  -ROBY           User Key  (r) = Recorded By, (t) = Taken By, (c) = Cosigned By    Initials Name Provider Type    ROBY Lilian Sarkar, PT Physical Therapist               Outcome Measures     Row Name 01/12/23 9343          How much help from another person do you currently need...    Turning from your back to your side while in flat bed without using bedrails? 4  -ROBY     Moving from lying on back to sitting on the side of a flat bed without bedrails? 4  -ROBY     Moving to and from a bed to a chair (including a wheelchair)? 4  -ROBY     Standing up from a chair using your arms (e.g., wheelchair, bedside chair)? 4  -ROBY     Climbing 3-5 steps with a railing? 3  -ROBY     To walk in hospital room? 4  -ROBY     AM-PAC 6 Clicks Score (PT) 23  -ROBY     Highest level of mobility 7 --> Walked 25 feet or more  -ROBY            User Key  (r) = Recorded By, (t) = Taken By, (c) = Cosigned By    Initials Name Provider Type    Lilian Riley, PT Physical Therapist                             Physical Therapy Education     Title: PT OT SLP Therapies (In Progress)     Topic: Physical Therapy (In Progress)     Point: Mobility training (In Progress)     Learning Progress Summary           Patient Acceptance, E, NR by ROBY at 1/12/2023 1300    Acceptance, E,TB, VU by CB at 1/12/2023 0315    Acceptance, E,TB, VU,NR by HM at 1/11/2023 1531    Acceptance, E, NR by KG at 1/10/2023 1402                   Point: Home exercise program (In Progress)     Learning Progress Summary           Patient Acceptance, E, NR by ROBY at 1/12/2023 1300    Acceptance, E,TB, VU by CB at 1/12/2023 0315    Acceptance, E,TB, VU,NR by HM at 1/11/2023 1531                   Point: Body mechanics (In Progress)     Learning Progress Summary           Patient Acceptance, E, NR by ROBY at 1/12/2023 1300    Acceptance, E,TB, VU by CB at 1/12/2023 0315    Acceptance, E,TB, VU,NR by HM at 1/11/2023 1531    Acceptance, E, NR by KG at 1/10/2023 1402                   Point: Precautions (In Progress)     Learning Progress Summary           Patient Acceptance, E, NR by ROBY at 1/12/2023 1300    Acceptance, E,TB, VU by CB at 1/12/2023 0315    Acceptance, E,TB, VU,NR by HM at 1/11/2023 1531    Acceptance, E, NR by KG at 1/10/2023 1402                               User Key     Initials Effective Dates Name Provider Type Discipline    ROBY 06/16/21 -  Lilian Sarkar, PT Physical Therapist PT    KG 05/22/20 -  Natalya Stewart PT Physical Therapist PT     06/16/21 -  Pasha Austin, RN Registered Nurse Nurse     09/22/22 -  Gisel Weeks PT Physical Therapist PT              PT Recommendation and Plan  Planned Therapy Interventions (PT): balance training, bed mobility training, gait training, stair training, transfer training  Plan of Care Reviewed With:  patient  Progress: improving  Outcome Evaluation: patient is now feeling better patient is independent with bed mobility transfers and gait he is waiting on someone to do the epley procedure. patient has met all goals for mobility and will go home with family assist at D/C     Time Calculation:    PT Charges     Row Name 01/12/23 1339             Time Calculation    Start Time 1300  -ROBY      PT Received On 01/12/23  -ROBY      PT Goal Re-Cert Due Date 01/20/23  -ROBY         Time Calculation- PT    Total Timed Code Minutes- PT 23 minute(s)  -ROBY         Timed Charges    64803 - PT Therapeutic Activity Minutes 23  -ROBY         Total Minutes    Timed Charges Total Minutes 23  -ROBY       Total Minutes 23  -ROBY            User Key  (r) = Recorded By, (t) = Taken By, (c) = Cosigned By    Initials Name Provider Type    Lilian Riley PT Physical Therapist              Therapy Charges for Today     Code Description Service Date Service Provider Modifiers Qty    90357111616 HC PT THERAPEUTIC ACT EA 15 MIN 1/12/2023 Lilian Sarkar PT GP 2          PT G-Codes  Outcome Measure Options: AM-PAC 6 Clicks Basic Mobility (PT)  AM-PAC 6 Clicks Score (PT): 23  AM-PAC 6 Clicks Score (OT): 16  PT Discharge Summary  Anticipated Discharge Disposition (PT): home with assist    Lilian Sarkar PT  1/12/2023

## 2023-01-12 NOTE — PROGRESS NOTES
"DOS: 2023  NAME: Joseph Garcia   : 1960  PCP: Brandon Wei MD  Chief Complaint   Patient presents with   • Dizziness       Chief complaint: No headaches at this point.  Subjective: Feels remarkably better compared to yesterday after the scopolamine patch was applied.  He is able to get up and ambulate the entire hallway and his Romberg is negative and the Marvin balance test is negative.  However if he turns too quick he notices that the room still tends to spin from left to right.    Objective:  Vital signs: /82 (BP Location: Right arm, Patient Position: Sitting)   Pulse 54   Temp 97.7 °F (36.5 °C) (Oral)   Resp 16   Ht 177.8 cm (70\")   Wt 103 kg (226 lb)   SpO2 96%   BMI 32.43 kg/m²    Gen: NAD, vitals reviewed  MS: Normal.  CN: Cranials 2-12: No focal deficits.  Motor: Muscles of both upper lower extremities show good bulk power and tone.  Sensory: No sensory loss noted.  DTRs: 1+ bilaterally with downgoing toes.  Coordination: Normal finger-to-nose coordination noted.  Gait: He was able to get up and ambulate independently the entire hallway and his Romberg is negative and the Marvin balance test is also negative.    ROS:  General: Pleasant gentleman currently feeling a lot better compared to yesterday.  Neurological: Still has a slight sensation of the room spinning when he turns his head fast.    Laboratory results:  Lab Results   Component Value Date    GLUCOSE 100 (H) 01/10/2023    CALCIUM 9.6 01/10/2023     01/10/2023    K 4.2 01/10/2023    CO2 26.0 01/10/2023     01/10/2023    BUN 11 01/10/2023    CREATININE 0.70 (L) 01/10/2023    EGFRIFNONA 66 2021    BCR 15.7 01/10/2023    ANIONGAP 10.0 01/10/2023     Lab Results   Component Value Date    WBC 7.97 01/10/2023    HGB 13.2 01/10/2023    HCT 38.9 01/10/2023    MCV 86.3 01/10/2023     01/10/2023     Lab Results   Component Value Date     (H) 01/10/2023     (H) 2023         Lab 23  1729 "   HEMOGLOBIN A1C 5.50        Review of labs: The creatinine is 0.7 and the LDL is elevated at 165.    Review and interpretation of imaging: The MRI of the brain without contrast with stroke protocol was reviewed that shows the following:    Findings:   No foci of restricted diffusion or abnormal susceptibility. No intra-axial signal abnormalities. CSF spaces/ventricles are age appropriate. No abnormal extra-axial fluid collection. Cerebellar tonsils in normal position. Pituitary gland normal in size.   No suprasellar mass. Major intracranial flow voids present. No acute skull abnormality. No fluid in the visualized paranasal sinuses/middle ear cavities      IMPRESSION:  Impression:   No evidence of acute infarct. No acute intracranial process demonstrated       Workup to date: CT angiogram of the head and neck with contrast was also reviewed that shows the following:    Findings:     Apices are grossly clear. Evaluation of the neck soft tissues demonstrates no pathologic adenopathy or unexpected soft tissue neck mass. The salivary glands appear symmetric. Evaluation of the osseous structures demonstrates multilevel spondylosis,   without evidence of acute fracture or aggressive osseous lesion. Patent aortic arch demonstrating typical 3 vessel branching. On the right, there is no significant atherosclerotic narrowing of the ICA origin, 0% by the set criteria. Similar 0% narrowing   is present on the left. The left vertebral artery is diffusely diminutive, otherwise patent. The right vertebral artery is normal in course and caliber. Intracranially, the carotid siphons demonstrate no significant atherosclerotic narrowing. The   anterior cerebral arteries are normal in course and caliber bilaterally. The right middle cerebral artery demonstrates no evidence of flow-limiting stenosis, large vessel occlusion or aneurysmal dilatation. The left middle cerebral artery is similarly   normal in course and caliber. The  vertebrobasilar system is patent. The posterior cerebral arteries are normal in course and caliber bilaterally.     IMPRESSION:  Impression:  Essentially normal CT angiogram of the head and neck. There is no evidence of high-grade flow-limiting stenosis, large vessel occlusion or aneurysmal dilatation.       Diagnoses: Patient with benign positional vertigo.      Comment: Headaches have remarkably improved.  Usually with basilar migraine the scopolamine patch might have minimal effect.    Plan:  1.  Discussed with the patient and his registered nurse Cari about getting the Epley maneuver completed this afternoon before he goes home.  2.  Continue the scopolamine patch as before.  3.  Follow-up with ENT specialist as outpatient.    Discussed with Dr. Nubia Rubi, his hospitalist as well as the patient and his wife that if medically stable he can be discharged home and I do not have any further suggestions and will be signing off at this point.    Spent a total of 30 minutes in face-to-face evaluation and management of the patient.    Konstantin Henry MD
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
## 2023-01-13 ENCOUNTER — TREATMENT (OUTPATIENT)
Dept: PHYSICAL THERAPY | Facility: CLINIC | Age: 63
End: 2023-01-13
Payer: COMMERCIAL

## 2023-01-13 DIAGNOSIS — R42 VERTIGO: Primary | ICD-10-CM

## 2023-01-13 PROCEDURE — 97110 THERAPEUTIC EXERCISES: CPT | Performed by: PHYSICAL THERAPIST

## 2023-01-13 PROCEDURE — 97162 PT EVAL MOD COMPLEX 30 MIN: CPT | Performed by: PHYSICAL THERAPIST

## 2023-01-13 PROCEDURE — 97140 MANUAL THERAPY 1/> REGIONS: CPT | Performed by: PHYSICAL THERAPIST

## 2023-01-13 NOTE — PROGRESS NOTES
"    Physical Therapy Initial Evaluation and Plan of Care    [facility]    [address], [address] [place] [address]    Patient: Joseph Garcia   : 1960  Diagnosis/ICD-10 Code:  Vertigo [R42]  Referring practitioner: YOUSUF Salgado  Date of Initial Visit: 2023  Today's Date: 2023  Patient seen for 1 session         Visit Diagnoses:    ICD-10-CM ICD-9-CM   1. Vertigo  R42 780.4           Subjective Evaluation    History of Present Illness  Mechanism of injury: Pt states that on  he was at home and he started to stand up and he felt like the \"bottom dropped out\" and he got very dizzy and felt like the room was spinning. He was on the floor and he felt like he was getting dizzy with any movement. He was able to crawl to the bed and laid in the bed for about 30 minutes and he was able to call his wife and get in to see his primary care MD who had him go to the emergency room to rule out a possible stroke. While getting registered in the waiting area he had another onset of symptoms and had to get on the floor for a while. He eventually was admitted and any neurologic or cardiac symptoms were ruled out.     Quality of life: good             Objective     See flow sheet for detailed vestibular objective info       Assessment & Plan     Assessment  Impairments: activity intolerance, impaired balance, lacks appropriate home exercise program and safety issue  Functional Limitations: lifting and walking  Assessment details: Patient is a 62 year old male who comes to physical therapy with c/o intermittent vertigo with a recent hospitaliztion. Signs and symptoms are consistent with BPPV resulting in pain, decreased ROM, decreased strength, and inability to perform all essential functional activities. Pt will benefit from skilled PT services to address the above issues.     Prognosis: good    Goals  Plan Goals: Goals: 4 weeks    1. Pt to demonstrate independence with his HEP  2. Pt to report " being able to sleep and rise from sitting without exacerbation of symptoms  3. Pt to perform merida-doroff and desensitization activities without exacerbation of symptoms      Plan  Therapy options: will be seen for skilled therapy services  Planned therapy interventions: abdominal trunk stabilization, balance/weight-bearing training, home exercise program, manual therapy, motor coordination training, neuromuscular re-education, soft tissue mobilization, strengthening, stretching and therapeutic activities  Frequency: 2x week  Duration in weeks: 6  Treatment plan discussed with: patient        Timed:         Manual Therapy:    13     mins  06042;     Therapeutic Exercise:    12     mins  99652;     Neuromuscular Long:        mins  32418;    Therapeutic Activity:          mins  03500;     Gait Training:           mins  19799;     Ultrasound:          mins  10038;    Ionto                                   mins   17626  Self Care                            mins   58115  Canalith Repos         mins 50637      Un-Timed:  Electrical Stimulation:         mins  71372 ( );  Dry Needling          mins self-pay  Traction          mins 42311  Low Eval          Mins  18587  Mod Eval     25     Mins  60461  High Eval                            Mins  05776        Timed Treatment:   25   mins   Total Treatment:     50   mins          PT: Demian Lopez PT, DPT, OCS, Cert. DN   License Number: [id number]  Electronically signed by Demian Lopez PT, 01/13/23, 1:13 PM EST    Certification Period: 1/13/2023 thru 4/12/2023  I certify that the therapy services are furnished while this patient is under my care.  The services outlined above are required by this patient, and will be reviewed every 90 days.         Physician Signature:__________________________________________________    PHYSICIAN: Luisa Sánchez APRN  NPI: [id number]                                      DATE:      Please sign and return via fax to [email or web address] .  Thank you, [facility].

## 2023-01-14 LAB
QT INTERVAL: 432 MS
QTC INTERVAL: 488 MS

## 2023-01-17 ENCOUNTER — TELEPHONE (OUTPATIENT)
Dept: PHYSICAL THERAPY | Facility: CLINIC | Age: 63
End: 2023-01-17

## 2023-01-19 ENCOUNTER — TREATMENT (OUTPATIENT)
Dept: PHYSICAL THERAPY | Facility: CLINIC | Age: 63
End: 2023-01-19
Payer: COMMERCIAL

## 2023-01-19 DIAGNOSIS — R42 VERTIGO: Primary | ICD-10-CM

## 2023-01-19 PROCEDURE — 97112 NEUROMUSCULAR REEDUCATION: CPT | Performed by: PHYSICAL THERAPIST

## 2023-01-19 PROCEDURE — 97140 MANUAL THERAPY 1/> REGIONS: CPT | Performed by: PHYSICAL THERAPIST

## 2023-01-24 NOTE — PROGRESS NOTES
Physical Therapy Daily Treatment Note    [facility]   [address], [address] [place] [address]      Patient: Joseph Garcia   : 1960  Referring practitioner: YOUSUF Salgado  Date of Initial Visit: Type: THERAPY  Noted: 2023  Today's Date: 2023  Patient seen for 2 sessions    Certification Period 2023 thru 2023       Visit Diagnoses:    ICD-10-CM ICD-9-CM   1. Vertigo  R42 780.4       Subjective     Pt states that he feels like he has had some improvement since coming home from the hospital and he has been doing more acrtivity around the house and has not had any severe symptoms, but he continues to have some minor vertigo.     Objective   See Exercise, Manual, and Modality Logs for complete treatment.       Assessment/Plan     Pt did not have any significant onset of symptoms with Epley maneuver today. He seems to have the most issue with rising from sidelying to sitting.       Timed:         Manual Therapy:    28     mins  98616;     Therapeutic Exercise:         mins  48606;     Neuromuscular Long:    25    mins  36643;    Therapeutic Activity:          mins  44692;     Gait Training:           mins  93014;     Ultrasound:          mins  15238;    Ionto                                   mins   13220  Self Care                            mins   40169  Canalith Repos         mins 81003  Electrical Stimulation:         mins  68742    Un-Timed:  Electrical Stimulation:         mins  97176 ( );  Dry Needling          mins self-pay  Traction          mins 57688      Timed Treatment:   53   mins   Total Treatment:     53   mins    Demian Lopez, PT, DPT, OCS, Cert. DN  [place] License: [id number]

## 2023-10-12 ENCOUNTER — TELEPHONE (OUTPATIENT)
Dept: UROLOGY | Facility: CLINIC | Age: 63
End: 2023-10-12
Payer: COMMERCIAL

## 2023-10-12 NOTE — TELEPHONE ENCOUNTER
Hub staff attempted to follow warm transfer process and was unsuccessful     Caller: Joseph Garcia    Relationship to patient: Self    Best call back number: [phone]    Patient is needing: PT THINKS HE MAY HAVE PASSED A KIDNEY STONE. HE WOULD LIKE TO GET AN APPT WITH DR AYALA. PLEASE CALL TO ADVISE. THANK YOU.

## 2023-10-13 NOTE — TELEPHONE ENCOUNTER
I spoke with patient. Follow up on 10/16 canceled. Will coordinate scheduling imaging once order has been placed and reschedule follow up with Dr. Hargrove after to discuss.

## 2023-10-16 DIAGNOSIS — N20.0 NEPHROLITHIASIS: Primary | ICD-10-CM

## 2023-10-17 NOTE — TELEPHONE ENCOUNTER
Spoke with patient to let him know CT order was placed. Central scheduling will be giving patient a call to schedule. I offered to give patient scheduling's number to speed things up, but he stated he would wait until they contacted him since he will be going out of town.

## 2025-06-19 ENCOUNTER — TELEPHONE (OUTPATIENT)
Dept: CARDIOLOGY | Facility: CLINIC | Age: 65
End: 2025-06-19
Payer: COMMERCIAL

## 2025-06-19 RX ORDER — HYDROCODONE BITARTRATE AND ACETAMINOPHEN 5; 300 MG/1; MG/1
1 TABLET ORAL AS NEEDED
COMMUNITY
Start: 2025-04-14

## 2025-06-19 NOTE — PROGRESS NOTES
[facility] Cardiology  Consultation H&P  Joseph Garcia  1960  [address] [address]     VISIT DATE:  06/20/25    PCP: Brandon Wei MD  [address] [address]    IDENTIFICATION: A 65 y.o. male, , retired [employer]   Prev cardio: Jenna, 3/2023    PROBLEM LIST:   Chest pain  2021 CT abd mild CAC  Dizziness  1/23 neuroimaging negative  Vertigo vs viral vestibulitis  1/23  Echo: EF 55%, trace MR, mild TR, RVSP 31, saline test negative  HLD  1/23  392-18-  2/25  052-48-  Prediabetes  2/25  A1c 5.9  Kidney stones  Surgical history:  Cystoscopy/uteroscopy with stent insertion  Knee surgery  Shoulder surgery      CC:  Chief Complaint   Patient presents with    Establish Care       Allergies  Allergies   Allergen Reactions    Ibuprofen Hives       Current Medications  Current Outpatient Medications   Medication Instructions    atorvastatin (LIPITOR) 80 mg, Oral, Daily    CAMBIA 50 MG pack TAKE 1 PACKET BY MOUTH AT ONSET OF HEADACHE ONCE DAILY    Diclofenac Sodium (Pennsaid) 2 % solution Pennsaid 20 mg/gram/actuation (2 %) topical soln in metered-dose pump    FLECTOR 1.3 % patch patch APPLY 1 PATCH TO AFFECTED AREA(S) TWICE DAILY    fluticasone (FLONASE) 50 MCG/ACT nasal spray 2 sprays, Daily    HYDROcodone-Acetaminophen (XODOL) 5-300 MG per tablet 1 tablet, As Needed    ondansetron ODT (ZOFRAN ODT) 4 mg, Translingual, Every 8 Hours PRN    tadalafil (CIALIS) 20 MG tablet TK 1 T PO DAILY 1 HOUR BEFORE NEEDED    tiZANidine (ZANAFLEX) 4 mg, 3 Times Daily PRN        History of Present Illness   HPI  Joseph Garcia is a 65 y.o. year old male with the above mentioned PMH who presents for consult from Brandon Wei MD for evaluation of cardiac disease.   Family history of precocious atherosclerosis first-degree family members.  He states he is active with no overt exercise.  He notes no cardiac symptoms  He is transition his diet  "and attempts to lose weight.  He is also planning on moving to [place] full-time within the next 1      ROS  Review of Systems   Constitutional: Positive for weight gain.       SOCIAL HX  Social History     Socioeconomic History    Marital status:    Tobacco Use    Smoking status: Former    Smokeless tobacco: Never   Vaping Use    Vaping status: Never Used   Substance and Sexual Activity    Alcohol use: Never    Drug use: Defer    Sexual activity: Defer       FAMILY HX  Family History   Problem Relation Age of Onset    Stroke Mother     Atrial fibrillation Mother     Stroke Father     Diabetes Brother     Cancer Maternal Grandmother     Lung disease Maternal Grandfather        OBJECTIVE:  Vitals:    06/20/25 0911   BP: 138/82   BP Location: Right arm   Patient Position: Sitting   Cuff Size: Adult   Pulse: 68   SpO2: 97%   Weight: 108 kg (238 lb 12.8 oz)   Height: 177.8 cm (70\")     Body mass index is 34.26 kg/m².     PHYSICAL EXAMINATION:  Constitutional:       Appearance: Healthy appearance. Not in distress.   Neck:      Vascular: No JVR. JVD normal.   Pulmonary:      Effort: Pulmonary effort is normal.      Breath sounds: Normal breath sounds. No wheezing. No rhonchi. No rales.   Chest:      Chest wall: Not tender to palpatation.   Cardiovascular:      PMI at left midclavicular line. Normal rate. Regular rhythm. Normal S1. Normal S2.       Murmurs: There is no murmur.      No gallop.  No click. No rub.   Pulses:     Intact distal pulses.   Edema:     Peripheral edema absent.   Abdominal:      General: Bowel sounds are normal.      Palpations: Abdomen is soft.      Tenderness: There is no abdominal tenderness.   Musculoskeletal: Normal range of motion.         General: No tenderness. Skin:     General: Skin is warm and dry.   Neurological:      General: No focal deficit present.      Mental Status: Alert and oriented to person, place and time.         Diagnostic Data:    ECG 12 Lead    Date/Time: 6/20/2025 " 10:20 AM  Performed by: Raffaele Clement MD    Authorized by: Raffaele Clement MD  Previous ECG: no previous ECG available  Rhythm: sinus rhythm  BPM: 68    Clinical impression: normal ECG          LABS:    Lab Results   Component Value Date    CHOL 237 (H) 01/10/2023    TRIG 51 01/10/2023    HDL 64 (H) 01/10/2023     (H) 01/10/2023      Lab Results   Component Value Date    GLUCOSE 100 (H) 01/10/2023    CALCIUM 9.6 01/10/2023     01/10/2023    K 4.2 01/10/2023    CO2 26.0 01/10/2023     01/10/2023    BUN 11 01/10/2023    CREATININE 0.70 (L) 01/10/2023    EGFR 102.9 01/10/2023    BCR 15.7 01/10/2023    ANIONGAP 10.0 01/10/2023      Lab Results   Component Value Date    WBC 7.97 01/10/2023    HGB 13.2 01/10/2023    HCT 38.9 01/10/2023    MCV 86.3 01/10/2023     01/10/2023     Lab Results   Component Value Date    HGBA1C 5.50 01/09/2023      Lab Results   Component Value Date    TSH 0.795 01/11/2023          Advance Care Planning   ACP discussion was held with the patient during this visit. Patient does not have an advance directive, information provided.         ASSESSMENT:     Diagnosis Plan   1. Dyslipidemia  CT cardiac calcium score wo dye      2. Mixed hyperlipidemia            PLAN:    Mixed dyslipidemia will document cardiac calcium score as she is currently on statin therapy with 50% reduction of initial LDL.  With any significant atherosclerotic burden would consider CCTA          Brandon Wei MD, thank you for referring Mr. Garcia for evaluation.  I have forwarded my electronically generated recommendations to you for review.  Please do not hesitate to call with any questions.      Raffaele Clement MD, FACC

## 2025-06-20 ENCOUNTER — OFFICE VISIT (OUTPATIENT)
Dept: CARDIOLOGY | Facility: CLINIC | Age: 65
End: 2025-06-20
Payer: COMMERCIAL

## 2025-06-20 VITALS
BODY MASS INDEX: 34.19 KG/M2 | HEIGHT: 70 IN | SYSTOLIC BLOOD PRESSURE: 138 MMHG | HEART RATE: 68 BPM | OXYGEN SATURATION: 97 % | DIASTOLIC BLOOD PRESSURE: 82 MMHG | WEIGHT: 238.8 LBS

## 2025-06-20 DIAGNOSIS — E78.5 DYSLIPIDEMIA: Primary | ICD-10-CM

## 2025-06-20 DIAGNOSIS — E78.2 MIXED HYPERLIPIDEMIA: ICD-10-CM

## 2025-06-24 ENCOUNTER — RESULTS FOLLOW-UP (OUTPATIENT)
Dept: CARDIOLOGY | Facility: CLINIC | Age: 65
End: 2025-06-24
Payer: COMMERCIAL

## 2025-06-24 RX ORDER — ASPIRIN 81 MG/1
81 TABLET ORAL DAILY
Start: 2025-06-24

## 2025-06-24 NOTE — TELEPHONE ENCOUNTER
----- Message from Raffaele Clement sent at 6/24/2025  2:24 PM EDT -----   continue atorvastatin and aspirin 81  Need to fu Coronary CTA in 2 years as aorta mildly dilated 4.3, nl <4.0 cm  ----- Message -----  From: Interface, Scans Incoming  Sent: 6/23/2025  11:26 AM EDT  To: Raffaele Clement MD      Pt called informed of the above results,via voice mail. Pt sent  diet information via mail.   
Home

## (undated) DEVICE — NITINOL STONE RETRIEVAL BASKET: Brand: ZERO TIP

## (undated) DEVICE — Device

## (undated) DEVICE — PK CYSTO-TUR BASIC 10

## (undated) DEVICE — GLV SURG BIOGEL LTX PF 7 1/2

## (undated) DEVICE — NITINOL WIRE WITH HYDROPHILIC TIP: Brand: SENSOR